# Patient Record
Sex: FEMALE | Race: ASIAN | URBAN - METROPOLITAN AREA
[De-identification: names, ages, dates, MRNs, and addresses within clinical notes are randomized per-mention and may not be internally consistent; named-entity substitution may affect disease eponyms.]

---

## 2020-01-23 ENCOUNTER — NURSE TRIAGE (OUTPATIENT)
Dept: FAMILY MEDICINE | Facility: CLINIC | Age: 60
End: 2020-01-23

## 2020-01-23 ENCOUNTER — OFFICE VISIT (OUTPATIENT)
Dept: FAMILY MEDICINE | Facility: CLINIC | Age: 60
End: 2020-01-23
Payer: COMMERCIAL

## 2020-01-23 VITALS
RESPIRATION RATE: 16 BRPM | WEIGHT: 144.4 LBS | OXYGEN SATURATION: 98 % | BODY MASS INDEX: 28.35 KG/M2 | HEART RATE: 71 BPM | HEIGHT: 60 IN | DIASTOLIC BLOOD PRESSURE: 78 MMHG | TEMPERATURE: 98.2 F | SYSTOLIC BLOOD PRESSURE: 133 MMHG

## 2020-01-23 DIAGNOSIS — E11.69 TYPE 2 DIABETES MELLITUS WITH OTHER SPECIFIED COMPLICATION, WITHOUT LONG-TERM CURRENT USE OF INSULIN (H): Primary | ICD-10-CM

## 2020-01-23 DIAGNOSIS — Z12.11 SPECIAL SCREENING FOR MALIGNANT NEOPLASMS, COLON: ICD-10-CM

## 2020-01-23 DIAGNOSIS — Z13.6 CARDIOVASCULAR SCREENING; LDL GOAL LESS THAN 100: ICD-10-CM

## 2020-01-23 DIAGNOSIS — Z11.59 NEED FOR HEPATITIS C SCREENING TEST: ICD-10-CM

## 2020-01-23 DIAGNOSIS — J45.31 MILD PERSISTENT ASTHMA WITH ACUTE EXACERBATION: ICD-10-CM

## 2020-01-23 DIAGNOSIS — J31.0 CHRONIC RHINITIS: ICD-10-CM

## 2020-01-23 DIAGNOSIS — Z11.4 ENCOUNTER FOR SCREENING FOR HIV: ICD-10-CM

## 2020-01-23 DIAGNOSIS — Z23 NEED FOR PROPHYLACTIC VACCINATION AND INOCULATION AGAINST INFLUENZA: ICD-10-CM

## 2020-01-23 DIAGNOSIS — Z23 NEED FOR 23-POLYVALENT PNEUMOCOCCAL POLYSACCHARIDE VACCINE: ICD-10-CM

## 2020-01-23 LAB
ALBUMIN SERPL-MCNC: 3.9 G/DL (ref 3.4–5)
ALP SERPL-CCNC: 95 U/L (ref 40–150)
ALT SERPL W P-5'-P-CCNC: 33 U/L (ref 0–50)
ANION GAP SERPL CALCULATED.3IONS-SCNC: 3 MMOL/L (ref 3–14)
AST SERPL W P-5'-P-CCNC: 27 U/L (ref 0–45)
BILIRUB SERPL-MCNC: 0.4 MG/DL (ref 0.2–1.3)
BUN SERPL-MCNC: 15 MG/DL (ref 7–30)
CALCIUM SERPL-MCNC: 8.9 MG/DL (ref 8.5–10.1)
CHLORIDE SERPL-SCNC: 108 MMOL/L (ref 94–109)
CHOLEST SERPL-MCNC: 209 MG/DL
CO2 SERPL-SCNC: 29 MMOL/L (ref 20–32)
CREAT SERPL-MCNC: 0.58 MG/DL (ref 0.52–1.04)
CREAT UR-MCNC: 65 MG/DL
GFR SERPL CREATININE-BSD FRML MDRD: >90 ML/MIN/{1.73_M2}
GLUCOSE SERPL-MCNC: 88 MG/DL (ref 70–99)
HBA1C MFR BLD: 5.9 % (ref 0–5.6)
HDLC SERPL-MCNC: 51 MG/DL
LDLC SERPL CALC-MCNC: 114 MG/DL
MICROALBUMIN UR-MCNC: 5 MG/L
MICROALBUMIN/CREAT UR: 8.13 MG/G CR (ref 0–25)
NONHDLC SERPL-MCNC: 158 MG/DL
POTASSIUM SERPL-SCNC: 4.4 MMOL/L (ref 3.4–5.3)
PROT SERPL-MCNC: 7.6 G/DL (ref 6.8–8.8)
SODIUM SERPL-SCNC: 140 MMOL/L (ref 133–144)
TRIGL SERPL-MCNC: 221 MG/DL
TSH SERPL DL<=0.005 MIU/L-ACNC: 0.71 MU/L (ref 0.4–4)

## 2020-01-23 PROCEDURE — 83036 HEMOGLOBIN GLYCOSYLATED A1C: CPT | Performed by: NURSE PRACTITIONER

## 2020-01-23 PROCEDURE — 80053 COMPREHEN METABOLIC PANEL: CPT | Performed by: NURSE PRACTITIONER

## 2020-01-23 PROCEDURE — 99207 C FOOT EXAM  NO CHARGE: CPT | Performed by: NURSE PRACTITIONER

## 2020-01-23 PROCEDURE — 36415 COLL VENOUS BLD VENIPUNCTURE: CPT | Performed by: NURSE PRACTITIONER

## 2020-01-23 PROCEDURE — 99203 OFFICE O/P NEW LOW 30 MIN: CPT | Mod: 25 | Performed by: NURSE PRACTITIONER

## 2020-01-23 PROCEDURE — 90472 IMMUNIZATION ADMIN EACH ADD: CPT | Performed by: NURSE PRACTITIONER

## 2020-01-23 PROCEDURE — 82043 UR ALBUMIN QUANTITATIVE: CPT | Performed by: NURSE PRACTITIONER

## 2020-01-23 PROCEDURE — 90732 PPSV23 VACC 2 YRS+ SUBQ/IM: CPT | Performed by: NURSE PRACTITIONER

## 2020-01-23 PROCEDURE — 90682 RIV4 VACC RECOMBINANT DNA IM: CPT | Performed by: NURSE PRACTITIONER

## 2020-01-23 PROCEDURE — 90471 IMMUNIZATION ADMIN: CPT | Performed by: NURSE PRACTITIONER

## 2020-01-23 PROCEDURE — 86803 HEPATITIS C AB TEST: CPT | Performed by: NURSE PRACTITIONER

## 2020-01-23 PROCEDURE — 90714 TD VACC NO PRESV 7 YRS+ IM: CPT | Performed by: NURSE PRACTITIONER

## 2020-01-23 PROCEDURE — 84443 ASSAY THYROID STIM HORMONE: CPT | Performed by: NURSE PRACTITIONER

## 2020-01-23 PROCEDURE — 80061 LIPID PANEL: CPT | Performed by: NURSE PRACTITIONER

## 2020-01-23 PROCEDURE — 87389 HIV-1 AG W/HIV-1&-2 AB AG IA: CPT | Performed by: NURSE PRACTITIONER

## 2020-01-23 RX ORDER — ALBUTEROL SULFATE 90 UG/1
AEROSOL, METERED RESPIRATORY (INHALATION)
COMMUNITY
Start: 2018-10-28 | End: 2020-01-23

## 2020-01-23 RX ORDER — FLUTICASONE PROPIONATE 110 UG/1
1 AEROSOL, METERED RESPIRATORY (INHALATION)
COMMUNITY
Start: 2019-03-14 | End: 2020-01-23

## 2020-01-23 RX ORDER — FLUTICASONE PROPIONATE 50 MCG
1 SPRAY, SUSPENSION (ML) NASAL DAILY
Qty: 11.1 ML | Refills: 3 | Status: SHIPPED | OUTPATIENT
Start: 2020-01-23 | End: 2020-02-05

## 2020-01-23 RX ORDER — ALBUTEROL SULFATE 90 UG/1
2 AEROSOL, METERED RESPIRATORY (INHALATION) 4 TIMES DAILY
Qty: 1 INHALER | Refills: 3 | Status: SHIPPED | OUTPATIENT
Start: 2020-01-23 | End: 2021-06-29

## 2020-01-23 RX ORDER — ATORVASTATIN CALCIUM 20 MG/1
TABLET, FILM COATED ORAL
COMMUNITY
Start: 2019-03-14 | End: 2020-07-08

## 2020-01-23 RX ORDER — FLUTICASONE PROPIONATE 110 UG/1
1 AEROSOL, METERED RESPIRATORY (INHALATION) 2 TIMES DAILY
Qty: 1 INHALER | Refills: 5 | Status: SHIPPED | OUTPATIENT
Start: 2020-01-23 | End: 2020-03-30

## 2020-01-23 SDOH — HEALTH STABILITY: MENTAL HEALTH: HOW OFTEN DO YOU HAVE A DRINK CONTAINING ALCOHOL?: NEVER

## 2020-01-23 ASSESSMENT — MIFFLIN-ST. JEOR: SCORE: 1146.49

## 2020-01-23 ASSESSMENT — PAIN SCALES - GENERAL: PAINLEVEL: NO PAIN (0)

## 2020-01-23 NOTE — LETTER
My Asthma Action Plan    Name: Yeyo Bradley   YOB: 1960  Date: 1/23/2020   My doctor: ANTONI Philippe CNP   My clinic: [facility]        My Control Medicine: Fluticasone propionate (Flovent Diskus) - 100 mcg inhale  1 puff twice daily  My Rescue Medicine: Albuterol (Proair/Ventolin/Proventil HFA) 2-4 puffs EVERY 4 HOURS as needed. Use a spacer if recommended by your provider.  My Oral Steroid Medicine: NONE My Asthma Severity:   Moderate Persistent  Know your asthma triggers: smoke, upper respiratory infections, humidity, exercise or sports and cold air               GREEN ZONE   Good Control    I feel good    No cough or wheeze    Can work, sleep and play without asthma symptoms       Take your asthma control medicine every day.     1. If exercise triggers your asthma, take your rescue medication    15 minutes before exercise or sports, and    During exercise if you have asthma symptoms  2. Spacer to use with inhaler: If you have a spacer, make sure to use it with your inhaler             YELLOW ZONE Getting Worse  I have ANY of these:    I do not feel good    Cough or wheeze    Chest feels tight    Wake up at night   1. Keep taking your Green Zone medications  2. Start taking your rescue medicine:    every 20 minutes for up to 1 hour. Then every 4 hours for 24-48 hours.  3. If you stay in the Yellow Zone for more than 12-24 hours, contact your doctor.  4. If you do not return to the Green Zone in 12-24 hours or you get worse, start taking your oral steroid medicine if prescribed by your provider.           RED ZONE Medical Alert - Get Help  I have ANY of these:    I feel awful    Medicine is not helping    Breathing getting harder    Trouble walking or talking    Nose opens wide to breathe       1. Take your rescue medicine NOW  2. If your provider has prescribed an oral steroid medicine, start taking it NOW  3. Call your doctor NOW  4. If you are still in the Red Zone  after 20 minutes and you have not reached your doctor:    Take your rescue medicine again and    Call 911 or go to the emergency room right away    See your regular doctor within 2 weeks of an Emergency Room or Urgent Care visit for follow-up treatment.          Annual Reminders:  Meet with Asthma Educator,  Flu Shot in the Fall, consider Pneumonia Vaccination for patients with asthma (aged 19 and older).    Pharmacy: [id number] [id number] [facility]    Electronically signed by ANTONI Philippe CNP   Date: 01/23/20                      Asthma Triggers  How To Control Things That Make Your Asthma Worse    Triggers are things that make your asthma worse.  Look at the list below to help you find your triggers and what you can do about them.  You can help prevent asthma flare-ups by staying away from your triggers.      Trigger                                                          What you can do   Cigarette Smoke  Tobacco smoke can make asthma worse. Do not allow smoking in your home, car or around you.  Be sure no one smokes at a child s day care or school.  If you smoke, ask your health care provider for ways to help you quit.  Ask family members to quit too.  Ask your health care provider for a referral to Quit Plan to help you quit smoking, or call 5-095-658-PLAN.     Colds, Flu, Bronchitis  These are common triggers of asthma. Wash your hands often.  Don t touch your eyes, nose or mouth.  Get a flu shot every year.     Dust Mites  These are tiny bugs that live in cloth or carpet. They are too small to see. Wash sheets and blankets in hot water every week.   Encase pillows and mattress in dust mite proof covers.  Avoid having carpet if you can. If you have carpet, vacuum weekly.   Use a dust mask and HEPA vacuum.   Pollen and Outdoor Mold  Some people are allergic to trees, grass, or weed pollen, or molds. Try to keep your windows closed.  Limit time out doors when pollen  count is high.   Ask you health care provider about taking medicine during allergy season.     Animal Dander  Some people are allergic to skin flakes, urine or saliva from pets with fur or feathers. Keep pets with fur or feathers out of your home.    If you can t keep the pet outdoors, then keep the pet out of your bedroom.  Keep the bedroom door closed.  Keep pets off cloth furniture and away from stuffed toys.     Mice, Rats, and Cockroaches   Some people are allergic to the waste from these pests.   Cover food and garbage.  Clean up spills and food crumbs.  Store grease in the refrigerator.   Keep food out of the bedroom.   Indoor Mold  This can be a trigger if your home has high moisture. Fix leaking faucets, pipes, or other sources of water.   Clean moldy surfaces.  Dehumidify basement if it is damp and smelly.   Smoke, Strong Odors, and Sprays  These can reduce air quality. Stay away from strong odors and sprays, such as perfume, powder, hair spray, paints, smoke incense, paint, cleaning products, candles and new carpet.   Exercise or Sports  Some people with asthma have this trigger. Be active!  Ask your doctor about taking medicine before sports or exercise to prevent symptoms.    Warm up for 5-10 minutes before and after sports or exercise.     Other Triggers of Asthma  Cold air:  Cover your nose and mouth with a scarf.  Sometimes laughing or crying can be a trigger.  Some medicines and food can trigger asthma.

## 2020-01-23 NOTE — TELEPHONE ENCOUNTER
Reason for Call:  Other call back    Detailed comments: Pt's daughter states that the pt was in for a cough but was not prescribed anything to help with it. She would like to request a call back to consult. Thank you.    Phone Number Patient can be reached at: Other phone number:  [phone]    Best Time: Any    Can we leave a detailed message on this number? YES    Call taken on 1/23/2020 at 5:38 PM by Cami Denise

## 2020-01-23 NOTE — PATIENT INSTRUCTIONS
Patient Education     Jay Jay?n [Asthma, Adult ]  Jay Jay?n (asthma) là m?t c ? n b?nh mà các ?? ?ng khí cristian ph?i b? co th?t và h?n ch? wilber?ng khí. Viêm và s?ng ? ? ?ng thông khí còn gây thêm s? h?n ch?. Cristian m? t c?n jay jay ?n c?p tính, các y?u t? này gây nên khó th?, th? khò khè, ho và t?c ng?c.    M? t c?n jay jay ?n có th? phát kh?i b?i bridger?u ? i?u. Các tác nhân phát kh?i thô ng th? ?ng ni g?m c?m l? nh thông th? ?ng, viêm ph? qu?n, viêm ph?i, bu?n phi?n và t?p th? d?c bridger?u. Cristian ulisses?ng phân n?a nh? ng ng? ?i l?n b? b?nh jay jay?n, thì d? ?ng v?i thu?c lá, các ch?t gây ô bridger?m cristian không khí, b?i, m?c, ph?n nahomy và v?y súc v?t có th? gây nên c?n jay jay ?n. B? paresh các li? u l? ?ng thu?c tr? jay jay?n hàng ngày c?ng có th? darryl l? i c?n jay jay ?n.  B?nh jay jay?n có th? ki? m soát ?? ?c v?i các lo?i thu?c thích h?p và gi?m ti?p xúc v?i các ch?t gây d? ? ng ? ã ?? ?c bi?t.  Ch?m Sóc T?i Tania:  1. U?ng bridger? u n? ?c ho?c các ch?t l?ng khác (ít nh?t là 10 ly m?i ngày) cristian khi b? c?n jay jay ?n. ?i ?u này làm l?ng các ch?t ti?t cristian ph?i và làm cho d? th? h?n . N?u quý v? b? b?nh ivonne ho?c th?n, hãy xác nh?n v?i bác s? c?a quý v? tr? ?c khi quý v? u?ng m? t l? ?ng ch?t l?ng quá caio ? ?nh.  2. Dùng thu?c ?? ?c ch? ? ? nh ?úng nh ? ? ã ?? ?c khuy?n cáo. N?u quý v? có m?t cái ?ng hít c?m lisette ho?c thu? c khí dung ? ? hít vào (aerosol breathing medicine), ? ?ng dùng quá m?t l?n m?i b?n gi? tr? khi ? ã ?? ?c b? o làm nh? v ?y. N? u ?? ?c cho dùng thu?c tr? sinh ho?c prednisone, hãy dùng h?t thu?c ngay c? khi quý v? c?m th? y ? ? h?n rosalee vài ngày.  3. ? ?ng hút thu?c lá. Tránh ti?p xúc v?i khói thu?c c? a ng? ?i khác.  4. ? ?ng dùng aspirin ho?c thu?c ch?ng viêm nh? ibuprofen (Advil, Motrin) ho?c naproxen (Aleve, Naprosyn). Có ? ?n 20% kh? n?ng là m? t ng? ?i có b?nh jay jay?n s? ph?n ?ng v?i các lo?i thu?c này. Acetaminophen (Tylenol) an toàn khi s? d?ng.  Ti?p T?c Bairon Dõi  v?i bác s? c?a quý v? ho?c bairon s? h? ?ng d?n c?a nhân viên chúng tôi. Leonela galaviz darryl  markus bên ng? ?i quý v? t?t c? các lo?i thu? c ?ang dùng ? ? bác s? có th? nhìn th?y.  N?u x?y ra b?t c? ?i?u nào rosalee ?ây hãy L?P T?C ?I?U TR? Y T?:    Th? khò khè ho?c h ?i th ? ng?n alistair t?ng    C?n ph?i dùng ?ng hít c?a quý v? bridger? u h?n th? ?ng l? mà không b?t    Sô?t 100.4 F (38 C) ho??c cortes h?n, ho??c markus chi? dâ?n cu?a ba?c si? cu?a caio? vi?    Ho có bridger? u ?àm có màu x?m ho? c có máu pablo ?àm (d?ch nh?y)    ? au ng?c markus t?ng nh?p th?    Quý v? không b? t ? ? u ? ? h?n pablo v òng 24 gi?  Date Last Reviewed: 5/1/2017 2000-2019 [employer]. [address] [place] [address], [place] [address]. T?t c? các caio?n ???c b?o l?u. Thông tin này không nh?m thay th? cho d?ch v? ch?m sóc y t? darryl tính chuyên môn. C?n luôn tuân markus s? ch? d?n t? chuyên alistair ch?m sóc s?c ulisses? c?a quý v?.           Patient Education     Ch? ?? ?n: B?nh ?ái tháo ???ng (Diabetes)  Th?c ?n là m?t công c? oxana tr?ng mà quý v? có th? s? d?ng ?? ki?m soát b?nh ?ái tháo ???ng và molina trì kh?e m?nh. ?n các b?a ?n có s? cân b?ng t?t v?i hàm l??ng th?c ?n ?úng s? giúp quý v? ki?m soát m?c glucose pablo máu c?a mình và ng?n ng?a các ph?n ?ng ???ng fritz?t th?p. Nó c?ng s? giúp quý v? gi?m các r?i ro s?c kh?e t? vi?c m?c b?nh ?ái tháo ???ng. M?t chuyên alistair ?n u?ng ???c ??ng ký (RD) s? gi?i thích ch? ?? ?n kiêng dành cho b?nh ?ái tháo ???ng và giúp quý v? l?p k? ho?ch b?a chính và b?a ph? có l?i cho s?c kh?e c?a quý v?. N?u quý v? có b?t k? câu h?i nào, ??ng ng?i và hãy g?i cho chuyên alistair ?n u?ng ?? ???c t? v?n.   Nguyên t?c thành công:    Ethan kh?o v?i bác s? c?a quý v? tr??c khi b?t ??u m?t ch? ?? ?n dành cho b?nh ?ái tháo ???ng ho?c ch??ng trình gi?m cân. N?u quý v? ch?a ethan kh?o v?i chuyên alistair ?n u?ng, hãy h?i bác s? c?a quý v? ?? h? gi?i thi?u.    Ch?n th?c ?n t? sáu nhóm th?c ?n. Chuyên alistair ?n u?ng c?a quý v? s? t? v?n cho quý v? v? các l?a ch?n th?c ?n pablo m?i nhóm, l??ng kh?u ph?n và quý v? có th? dùng ni nhiêu kh?u ph?n cho m?i b?a  ?n.  ? H?t, ??u và ra una b?t  ? Faith  ? Leora qu?  ? S?a ho?c s?a sabine  ? Th?t  ? Ch?t béo, ?? ng?t và r??u (ch? m?t ph?n nh? t? nhóm này)    Bairon dõi m?c ???ng fritz?t c?a quý v? nh? ???c bác s? c?a quý v? yêu c?u. Dùng m?i lo?i thu?c mà bác s? c?a quý v? ?ã kê ??n.    H?c cách ??c nhãn ekaterina d??ng và ch?n các kh?u ph?n ?n phù h?p.    Ch? ?n l??ng th?c ?n có pablo k? ho?ch b?a ?n c?a quý v?. ?n cùng l??ng th?c ?n vào ?úng gi? m?i ngày. Không ???c b? b?a. ?n các b?a cách nhau 4-5 gi? và ?n b?a ph? gi?a b?a.    H?n ch? r??u. Nó làm t?ng m?c ???ng fritz?t. U?ng n??c ho?c ?? u?ng không ch?a jacquelin s? d?ng ch?t t?o ng?t an toàn.    ?n ít ch?t béo ?? giúp gi?m r?i ro m?c b?nh ivonne c?a quý v?. S? d?ng s?n ph?m s?a không có ch?t béo ho?c ít ch?t béo và th?t n?c. Tránh th?c ?n xào. Dùng d?u th?c v?t không no.    Hãy nói orlando?n v?i chuyên alistair ekaterina d??ng c?a quý v? v? các ch?t thay th? ???ng.    Tránh thêm mu?i. Nó có th? góp ph?n gây ra fritz?t áp cortes, tình tr?ng này có th? gây ra b?nh ivonne. Ng??i m?c b?nh ?ái tháo ???ng v?n ?ã có r?i b? fritz?t áp cortes và b?nh ivonne.    Leonard trì m?t cân n?ng kh?e m?nh: N?u quý v? c?n gi?m cân, hãy gi?m kh?u ph?n ?n c?a mình. Nh?ng không ???c b? b?a. T?p th? d?c là m?t ph?n oxana tr?ng c?a b?t k? ch??ng trình qu?n lý cân n?ng nào. Hãy nói orlando?n v?i bác s? c?a quý v? v? m?t ch??ng trình t?p th? d?c phù h?p v?i quý v?.    ?? bi?t thêm thông tin v? k? ho?ch ?n u?ng t?t nh?t cho quý v?, hãy nói orlando?n v?i m?t chuyên alistair ?n u?ng ???c ??ng ký (RD). ?? ???c gi?i thi?u t?i RD t?i khu v?c c?a quý v?, hãy liên h?:  ? Vi?n Ekaterina d??ng và ?n u?ng [email or web address]  ? Hi?p h?i ?ái tháo ???ng [phone] [email or web address]  Date Last Reviewed: 8/1/2016 2000-2019 [email or web address]. [address] [place] [address], [place] [address]. T?t c? các caio?n ???c b?o l?u. Thông tin này không nh?m thay th? cho d?ch v? ch?m sóc y t? darryl tính chuflorn môn. C?n luôguillaume tuân bairon s? ch? d?n t? chuyên alistair ch?m sóc s?c ulisses? c?a javier v?.

## 2020-01-23 NOTE — NURSING NOTE
Prior to immunization administration, verified patients identity using patient s name and date of birth. Please see Immunization Activity for additional information.     Screening Questionnaire for Adult Immunization    Are you sick today?   No   Do you have allergies to medications, food, a vaccine component or latex?   No   Have you ever had a serious reaction after receiving a vaccination?   No   Do you have a long-term health problem with heart, lung, kidney, or metabolic disease (e.g., diabetes), asthma, a blood disorder, no spleen, complement component deficiency, a cochlear implant, or a spinal fluid leak?  Are you on long-term aspirin therapy?   Yes   Do you have cancer, leukemia, HIV/AIDS, or any other immune system problem?   No   Do you have a parent, brother, or sister with an immune system problem?   No   In the past 3 months, have you taken medications that affect  your immune system, such as prednisone, other steroids, or anticancer drugs; drugs for the treatment of rheumatoid arthritis, Crohn s disease, or psoriasis; or have you had radiation treatments?   No   Have you had a seizure, or a brain or other nervous system problem?   No   During the past year, have you received a transfusion of blood or blood    products, or been given immune (gamma) globulin or antiviral drug?   No   For women: Are you pregnant or is there a chance you could become       pregnant during the next month?   No   Have you received any vaccinations in the past 4 weeks?   No     Immunization questionnaire was positive for at least one answer.  Notified CARROL Chavez.        Per orders of Thein, CNP, injection of Td, PCV23 and Flu shot given by Dolores Hill MA. Patient instructed to remain in clinic for 15 minutes afterwards, and to report any adverse reaction to me immediately.       Screening performed by Dolores Hill MA on 1/23/2020 at 12:49 PM.

## 2020-01-23 NOTE — PROGRESS NOTES
"Subjective     Yeyo Bradley is a 59 year old female who presents to clinic today for the following health issues:    HPI       New Patient/Transfer of Care      Acute Illness   Acute illness concerns: cough  Onset: \"months'    Fever: no     Chills/Sweats: no     Headache (location?): with cough    Sinus Pressure:YES- maxillary worse with cough    Conjunctivitis:  no    Ear Pain: no    Rhinorrhea: YES-clear    Congestion: YES    Sore Throat: yes      Cough: YES-productive of green sputum in the am, then clear    Wheeze: YES    Decreased Appetite: no     Nausea: no     Vomiting: no     Diarrhea:  no     Dysuria/Freq.: no     Fatigue/Achiness: YES    Sick/Strep Exposure: no     Complain of bilateral rib cage pain due to coughing xmonth     Therapies Tried and outcome:  Cough drop/cough syrup      There is no problem list on file for this patient.    History reviewed. No pertinent surgical history.    Social History     Tobacco Use     Smoking status: Never Smoker     Smokeless tobacco: Never Used   Substance Use Topics     Alcohol use: Never     Frequency: Never     History reviewed. No pertinent family history.      Current Outpatient Medications   Medication Sig Dispense Refill     albuterol (PROAIR HFA/PROVENTIL HFA/VENTOLIN HFA) 108 (90 Base) MCG/ACT inhaler Inhale 2 puffs into the lungs 4 times daily 1 Inhaler 3     atorvastatin (LIPITOR) 20 MG tablet Take 1.5 tablets (30 mg) by mouth at bedtime.       fluticasone (FLONASE) 50 MCG/ACT nasal spray Spray 1 spray into both nostrils daily 11.1 mL 3     fluticasone (FLOVENT HFA) 110 MCG/ACT inhaler Inhale 1 puff into the lungs 2 times daily 1 Inhaler 5     metFORMIN (GLUCOPHAGE) 500 MG tablet Take 1 tablet (500 mg) by mouth once a day with breakfast. 90 tablet 3     order for DME Equipment being ordered: Glucometer per insurance preference, lancets, test strips.  Patient to check sugars 1 times daily, 90 day supply for test strips and lancets, refill 3 times 1 Device 0 "     No lab results found.   BP Readings from Last 3 Encounters:   No data found for BP    Wt Readings from Last 3 Encounters:   No data found for Wt                    Diabetes Follow-up      How often are you checking your blood sugar? Not at all    What concerns do you have today about your diabetes? None     Do you have any of these symptoms? (Select all that apply)  No numbness or tingling in feet.  No redness, sores or blisters on feet.  No complaints of excessive thirst.  No reports of blurry vision.  No significant changes to weight.    Have you had a diabetic eye exam in the last 12 months? No        BP Readings from Last 2 Encounters:   No data found for BP     No results found for: A1C, LDL            Reviewed and updated as needed this visit by Provider         Review of Systems   ROS COMP: Constitutional, HEENT, cardiovascular, pulmonary, gi and gu systems are negative, except as otherwise noted.      Objective    There were no vitals taken for this visit.  There is no height or weight on file to calculate BMI.  Physical Exam   GENERAL: healthy, alert and no distress  EYES: Eyes grossly normal to inspection, PERRL and conjunctivae and sclerae normal  HENT: ear canals and TM's normal, nose and mouth without ulcers or lesions  NECK: no adenopathy, no asymmetry, masses, or scars and thyroid normal to palpation  RESP: lungs clear to auscultation - no rales, rhonchi or wheezes  CV: regular rate and rhythm, normal S1 S2, no S3 or S4, no murmur, click or rub, no peripheral edema and peripheral pulses strong  ABDOMEN: soft, nontender, no hepatosplenomegaly, no masses and bowel sounds normal  MS: no gross musculoskeletal defects noted, no edema  SKIN: no suspicious lesions or rashes  NEURO: Normal strength and tone, mentation intact and speech normal  BACK: no CVA tenderness, no paralumbar tenderness  PSYCH: mentation appears normal, affect normal/bright  LYMPH: normal ant/post cervical, supraclavicular  nodes  Diabetic foot exam: normal DP and PT pulses, no trophic changes or ulcerative lesions, normal sensory exam and normal monofilament exam    Diagnostic Test Results:  Labs reviewed in Epic  Results for orders placed or performed in visit on 01/23/20   Hepatitis C Screen Reflex to HCV RNA Quant and Genotype     Status: None   Result Value Ref Range    Hepatitis C Antibody Nonreactive NR^Nonreactive   HIV Screening     Status: None   Result Value Ref Range    HIV Antigen Antibody Combo Nonreactive NR^Nonreactive       Lipid panel reflex to direct LDL Fasting     Status: Abnormal   Result Value Ref Range    Cholesterol 209 (H) <200 mg/dL    Triglycerides 221 (H) <150 mg/dL    HDL Cholesterol 51 >49 mg/dL    LDL Cholesterol Calculated 114 (H) <100 mg/dL    Non HDL Cholesterol 158 (H) <130 mg/dL   Comprehensive metabolic panel     Status: None   Result Value Ref Range    Sodium 140 133 - 144 mmol/L    Potassium 4.4 3.4 - 5.3 mmol/L    Chloride 108 94 - 109 mmol/L    Carbon Dioxide 29 20 - 32 mmol/L    Anion Gap 3 3 - 14 mmol/L    Glucose 88 70 - 99 mg/dL    Urea Nitrogen 15 7 - 30 mg/dL    Creatinine 0.58 0.52 - 1.04 mg/dL    GFR Estimate >90 >60 mL/min/[1.73_m2]    GFR Estimate If Black >90 >60 mL/min/[1.73_m2]    Calcium 8.9 8.5 - 10.1 mg/dL    Bilirubin Total 0.4 0.2 - 1.3 mg/dL    Albumin 3.9 3.4 - 5.0 g/dL    Protein Total 7.6 6.8 - 8.8 g/dL    Alkaline Phosphatase 95 40 - 150 U/L    ALT 33 0 - 50 U/L    AST 27 0 - 45 U/L   Hemoglobin A1c     Status: Abnormal   Result Value Ref Range    Hemoglobin A1C 5.9 (H) 0 - 5.6 %   Albumin Random Urine Quantitative with Creat Ratio     Status: None   Result Value Ref Range    Creatinine Urine 65 mg/dL    Albumin Urine mg/L 5 mg/L    Albumin Urine mg/g Cr 8.13 0 - 25 mg/g Cr   TSH with free T4 reflex     Status: None   Result Value Ref Range    TSH 0.71 0.40 - 4.00 mU/L           Assessment & Plan     1. Type 2 diabetes mellitus with other specified complication, without  long-term current use of insulin (H)  Getting her back on Metformin, begin checking her sugars daily at varying times, reviewed glycemic targets, she declines referral to CDE. Return to clinic 6 months for recheck.  - Comprehensive metabolic panel  - Hemoglobin A1c  - Albumin Random Urine Quantitative with Creat Ratio  - TSH with free T4 reflex  - metFORMIN (GLUCOPHAGE) 500 MG tablet; Take 1 tablet (500 mg) by mouth once a day with breakfast.  Dispense: 90 tablet; Refill: 3  - OPTOMETRY REFERRAL  - FOOT EXAM  - order for DME; Equipment being ordered: Glucometer per insurance preference, lancets, test strips.  Patient to check sugars 1 times daily, 90 day supply for test strips and lancets, refill 3 times  Dispense: 1 Device; Refill: 0    2. Chronic rhinitis  Advised humidified air at HS, frequent fluids, OK to continue on Tessalon (still has some), cough is likely due to  Nasal congestion, nasal congestion is clear by day so doubt sinusitis.    3. Mild persistent asthma with acute exacerbation  Refilled Flovent and albuterol, symptoms worsened with chronic cough.  - fluticasone (FLOVENT HFA) 110 MCG/ACT inhaler; Inhale 1 puff into the lungs 2 times daily  Dispense: 1 Inhaler; Refill: 5  - albuterol (PROAIR HFA/PROVENTIL HFA/VENTOLIN HFA) 108 (90 Base) MCG/ACT inhaler; Inhale 2 puffs into the lungs 4 times daily  Dispense: 1 Inhaler; Refill: 3    4. Need for prophylactic vaccination and inoculation against influenza    - Vaccine Administration, Initial [65220]  - INFLUENZA QUAD, RECOMBINANT, P-FREE (RIV4) (FLUBLOCK) [40787]  - Vaccine Administration, Each Additional [51653]    5. Need for 23-polyvalent pneumococcal polysaccharide vaccine    - Pneumococcal vaccine 23 valent PPSV23  (Pneumovax) [13343]    6. Special screening for malignant neoplasms, colon    - GASTROENTEROLOGY ADULT REF PROCEDURE ONLY    7. Encounter for screening for HIV    - HIV Screening    8. Need for hepatitis C screening test    - Hepatitis C  Screen Reflex to HCV RNA Quant and Genotype    9. CARDIOVASCULAR SCREENING; LDL GOAL LESS THAN 100    - Lipid panel reflex to direct LDL Fasting     BMI:   Estimated body mass index is 28.2 kg/m  as calculated from the following:    Height as of this encounter: 1.524 m (5').    Weight as of this encounter: 65.5 kg (144 lb 6.4 oz).   Weight management plan: Discussed healthy diet and exercise guidelines        Work on weight loss  Regular exercise  See Patient Instructions    Return in about 4 weeks (around 2/20/2020), or if symptoms worsen or fail to improve, for Routine Visit.    ANTONI Philippe [facility]

## 2020-01-24 ENCOUNTER — OFFICE VISIT (OUTPATIENT)
Dept: FAMILY MEDICINE | Facility: CLINIC | Age: 60
End: 2020-01-24
Payer: COMMERCIAL

## 2020-01-24 VITALS
BODY MASS INDEX: 28.19 KG/M2 | WEIGHT: 143.6 LBS | DIASTOLIC BLOOD PRESSURE: 62 MMHG | SYSTOLIC BLOOD PRESSURE: 121 MMHG | TEMPERATURE: 99.5 F | HEART RATE: 95 BPM | HEIGHT: 60 IN

## 2020-01-24 DIAGNOSIS — J06.9 UPPER RESPIRATORY TRACT INFECTION, UNSPECIFIED TYPE: Primary | ICD-10-CM

## 2020-01-24 LAB
FLUAV+FLUBV AG SPEC QL: NEGATIVE
FLUAV+FLUBV AG SPEC QL: NEGATIVE
HCV AB SERPL QL IA: NONREACTIVE
HIV 1+2 AB+HIV1 P24 AG SERPL QL IA: NONREACTIVE
SPECIMEN SOURCE: NORMAL

## 2020-01-24 PROCEDURE — 99213 OFFICE O/P EST LOW 20 MIN: CPT | Performed by: NURSE PRACTITIONER

## 2020-01-24 PROCEDURE — 87804 INFLUENZA ASSAY W/OPTIC: CPT | Performed by: NURSE PRACTITIONER

## 2020-01-24 RX ORDER — AZITHROMYCIN 250 MG/1
TABLET, FILM COATED ORAL
Qty: 6 TABLET | Refills: 0 | Status: SHIPPED | OUTPATIENT
Start: 2020-01-24 | End: 2020-02-05

## 2020-01-24 RX ORDER — CODEINE PHOSPHATE AND GUAIFENESIN 10; 100 MG/5ML; MG/5ML
1-2 SOLUTION ORAL EVERY 6 HOURS PRN
Qty: 180 ML | Refills: 0 | Status: SHIPPED | OUTPATIENT
Start: 2020-01-24 | End: 2020-02-04

## 2020-01-24 ASSESSMENT — ENCOUNTER SYMPTOMS
COUGH: 1
RHINORRHEA: 1
FEVER: 0
PALPITATIONS: 0
SORE THROAT: 1
DIZZINESS: 0
SINUS PRESSURE: 1
SHORTNESS OF BREATH: 0
VOMITING: 1
FATIGUE: 1
CHILLS: 1
HEADACHES: 1
NAUSEA: 1
TROUBLE SWALLOWING: 0
ABDOMINAL PAIN: 0

## 2020-01-24 ASSESSMENT — MIFFLIN-ST. JEOR: SCORE: 1142.87

## 2020-01-24 NOTE — TELEPHONE ENCOUNTER
.Reason for Call:  call back    Detailed comments:  Pt's daughter stated that the patient got injections  yesterday and that her right arm is now swelling . Pt is also requesting cough medication for her cough. Pt's daughter is requesting a call back to further discuss     Phone Number Patient can be reached at: Home number on file [phone] (home)    Best Time:      Can we leave a detailed message on this number? YES    Call taken on 1/24/2020 at 7:49 AM by Edu Mai

## 2020-01-24 NOTE — TELEPHONE ENCOUNTER
"  Additional Information    Negative: Difficulty with breathing or swallowing starts within 2 hours after injection    Negative: Difficult to awaken or acting confused (e.g., disoriented, slurred speech)    Negative: Unresponsive, passed out, or very weak    Negative: Sounds like a life-threatening emergency to the triager    Negative: Sounds like a severe, unusual reaction to the triager    Negative: Fever > 103 F (39.4 C)    Negative: Fever > 101 F (38.3 C) and over 60 years of age    Negative: Fever > 100.0 F (37.8 C) and has diabetes mellitus or a weak immune system (e.g., HIV positive, cancer chemotherapy, organ transplant, splenectomy, chronic steroids)    Negative: Fever > 100.0 F (37.8 C) and bedridden (e.g., nursing home patient, stroke, chronic illness, recovering from surgery)    Negative: Measles vaccine and purple/blood-colored rash (onset day 6-12)    Negative: Redness or red streak around the injection site begins > 48 hours after shot    Negative: Fever present > 3 days (72 hours)    Negative: Smallpox vaccine and eye pain, eye redness, or rash on eyelids    Patient wants to be seen    Negative: Deep lump follows (in 2 to 8 weeks) Td or TDaP shot, and becomes tender to the touch    Answer Assessment - Initial Assessment Questions  1. SYMPTOMS: \"What is the main symptom?\" (e.g., redness, swelling, pain)       Swelling of both upper arms, right is severe, pain, hard to move then  2. ONSET: \"When was the vaccine (shot) given?\" \"How much later did the  begin?\" (e.g., hours, days ago)       One hour after receiving the vaccinations  3. SEVERITY: \"How bad is it?\"       Per daughter severe  4. FEVER: \"Is there a fever?\" If so, ask: \"What is it, how was it measured, and when did it start?\"       no  5. IMMUNIZATIONS GIVEN: \"What shots have you recently received?\"      Td, influenza both left deltoid pneumonia in the right deltoid  6. PAST REACTIONS: \"Have you reacted to immunizations before?\" If so, ask: " "\"What happened?\"      no  7. OTHER SYMPTOMS: \"Do you have any other symptoms?\"      Inability to move arms as normal.    Gabrielle Ferrari RN, [facility] Triage    Protocols used: IMMUNIZATION [id number]    "

## 2020-01-24 NOTE — PROGRESS NOTES
Subjective     Yeyo Bradley is a 59 year old female who presents to clinic today for the following health issues:  A phone interpretor was used during office visit  HPI   RESPIRATORY SYMPTOMS      Duration: 1 month    Description  nasal congestion, rhinorrhea, sore throat, facial pain/pressure, cough, chills, ear pain both, headache, fatigue/malaise, myalgias, nausea and vomiting    Severity: moderate    Accompanying signs and symptoms: None    History (predisposing factors):  Family sick    Precipitating or alleviating factors: None    Therapies tried and outcome:  OTC cough meds     Reports coughing x 1 month, occasionally productive.  Reports coughing causes ribs to be sore.  Also having sinus congestion and sore throat.  Denies any known fever.  Reports OTC cough medications are not helping.      She also received an Influenza and Pneumococcal vaccine yesterday in a different clinic and reports tenderness of right arm at the injection site today.  She is concerned about this.        There is no problem list on file for this patient.    History reviewed. No pertinent surgical history.    Social History     Tobacco Use     Smoking status: Never Smoker     Smokeless tobacco: Never Used   Substance Use Topics     Alcohol use: Never     Frequency: Never     History reviewed. No pertinent family history.      Current Outpatient Medications   Medication Sig Dispense Refill     albuterol (PROAIR HFA/PROVENTIL HFA/VENTOLIN HFA) 108 (90 Base) MCG/ACT inhaler Inhale 2 puffs into the lungs 4 times daily 1 Inhaler 3     atorvastatin (LIPITOR) 20 MG tablet Take 1.5 tablets (30 mg) by mouth at bedtime.       azithromycin (ZITHROMAX) 250 MG tablet Take 2 tablets (500 mg) by mouth daily for 1 day, THEN 1 tablet (250 mg) daily for 4 days. 6 tablet 0     fluticasone (FLONASE) 50 MCG/ACT nasal spray Spray 1 spray into both nostrils daily 11.1 mL 3     fluticasone (FLOVENT HFA) 110 MCG/ACT inhaler Inhale 1 puff into the lungs 2  times daily 1 Inhaler 5     guaiFENesin-codeine (ROBITUSSIN AC) 100-10 MG/5ML solution Take 5-10 mLs by mouth every 6 hours as needed for cough 180 mL 0     metFORMIN (GLUCOPHAGE) 500 MG tablet Take 1 tablet (500 mg) by mouth once a day with breakfast. 90 tablet 3     order for DME Equipment being ordered: Glucometer per insurance preference, lancets, test strips.  Patient to check sugars 1 times daily, 90 day supply for test strips and lancets, refill 3 times 1 Device 0     No Known Allergies    Reviewed and updated as needed this visit by Provider         Review of Systems   Constitutional: Positive for chills and fatigue. Negative for fever.   HENT: Positive for congestion, ear pain, postnasal drip, rhinorrhea, sinus pressure and sore throat. Negative for trouble swallowing.    Respiratory: Positive for cough. Negative for shortness of breath.    Cardiovascular: Negative for chest pain and palpitations.   Gastrointestinal: Positive for nausea and vomiting. Negative for abdominal pain.   Neurological: Positive for headaches. Negative for dizziness.            Objective    /62   Pulse 95   Temp 99.5  F (37.5  C) (Oral)   Ht 1.524 m (5')   Wt 65.1 kg (143 lb 9.6 oz)   BMI 28.04 kg/m    Body mass index is 28.04 kg/m .  Physical Exam  Vitals signs reviewed.   Constitutional:       Appearance: She is well-developed.   HENT:      Head: Normocephalic.      Right Ear: Tympanic membrane normal.      Left Ear: Tympanic membrane normal.      Nose: Congestion present.      Mouth/Throat:      Lips: Pink.      Mouth: Mucous membranes are moist.      Pharynx: Oropharynx is clear.   Eyes:      General: Lids are normal.      Conjunctiva/sclera: Conjunctivae normal.   Neck:      Musculoskeletal: Normal range of motion and neck supple.   Cardiovascular:      Rate and Rhythm: Normal rate and regular rhythm.   Pulmonary:      Effort: Pulmonary effort is normal.      Breath sounds: Normal breath sounds.   Lymphadenopathy:       Cervical: No cervical adenopathy.   Skin:     General: Skin is warm and dry.      Comments: Mild warmth and tenderness at injection site of right deltoid from vaccine yesterday.  No evidence of cellulitis.     Neurological:      Mental Status: She is alert and oriented to person, place, and time.            Diagnostic Test Results:  Labs reviewed in Epic  Influenza A/B-- negative        Assessment & Plan     1. Upper respiratory tract infection, unspecified type  - Ensure adequate fluid intake.  - Influenza A/B antigen  - azithromycin (ZITHROMAX) 250 MG tablet; Take 2 tablets (500 mg) by mouth daily for 1 day, THEN 1 tablet (250 mg) daily for 4 days.  Dispense: 6 tablet; Refill: 0  - guaiFENesin-codeine (ROBITUSSIN AC) 100-10 MG/5ML solution; Take 5-10 mLs by mouth every 6 hours as needed for cough  Dispense: 180 mL; Refill: 0    Advised to monitor injection site of right deltoid.  Apply compress several times per day over the weekend.  Notify office if worsening redness, pain, or swelling on Monday.          Return in about 1 week (around 1/31/2020) for worsening symptoms or failure to improve.    Naa Bradley NP  [facility]

## 2020-01-24 NOTE — TELEPHONE ENCOUNTER
Scheduled in clinic for assessment of injection sites and request for cough medication.    Gabrielle Ferrari RN, [facility] Triage

## 2020-02-03 ENCOUNTER — TRANSFERRED RECORDS (OUTPATIENT)
Dept: HEALTH INFORMATION MANAGEMENT | Facility: CLINIC | Age: 60
End: 2020-02-03

## 2020-02-03 ENCOUNTER — TELEPHONE (OUTPATIENT)
Dept: FAMILY MEDICINE | Facility: CLINIC | Age: 60
End: 2020-02-03

## 2020-02-03 DIAGNOSIS — J06.9 ACUTE UPPER RESPIRATORY INFECTION: Primary | ICD-10-CM

## 2020-02-03 LAB
HBA1C MFR BLD: 6.3 % (ref 0–5.7)
HPV ABSTRACT: NORMAL
PAP-ABSTRACT: NORMAL

## 2020-02-03 RX ORDER — BENZONATATE 200 MG/1
200 CAPSULE ORAL 3 TIMES DAILY PRN
Qty: 30 CAPSULE | Refills: 0 | Status: SHIPPED | OUTPATIENT
Start: 2020-02-03 | End: 2020-07-10

## 2020-02-03 NOTE — TELEPHONE ENCOUNTER
Patient was seen on 1/24 for a cough. She has used the cough medicine but is still coughing. Is there something else she can try? Does she need to be seen again? Ok to leave a detailed message.

## 2020-02-03 NOTE — TELEPHONE ENCOUNTER
Returned call to daughter and gave her providers message below.  She understands this.   Also informed her that if this medication doesn't improve cough, patient should be seen again.     Nirmala ANGELESN, RN

## 2020-02-03 NOTE — TELEPHONE ENCOUNTER
Patient seen 1.5 weeks ago for URI. Was treated with Robitussin AC and zpak.   Patient still has bothersome cough that is sometimes productive.   Robitussin not helping very much and is requesting something else for cough.     Routing to provider to advise.  Nirmala Stack BSN, RN

## 2020-02-04 ENCOUNTER — TELEPHONE (OUTPATIENT)
Dept: FAMILY MEDICINE | Facility: CLINIC | Age: 60
End: 2020-02-04

## 2020-02-04 DIAGNOSIS — J06.9 UPPER RESPIRATORY TRACT INFECTION, UNSPECIFIED TYPE: ICD-10-CM

## 2020-02-04 RX ORDER — CODEINE PHOSPHATE AND GUAIFENESIN 10; 100 MG/5ML; MG/5ML
5-10 SOLUTION ORAL EVERY 6 HOURS PRN
Qty: 180 ML | Refills: 0 | Status: SHIPPED | OUTPATIENT
Start: 2020-02-04 | End: 2020-02-05

## 2020-02-04 NOTE — TELEPHONE ENCOUNTER
Returned call to daughter Laurita.    See telephone encounter from yesterday regarding cough.  Tessalon was ordered and then patient was seen at [facility] clinic and tessalon was ordered again.   Daughter now is requesting order for nebulizer and medication for ongoing cough.   Informed daughter again that if her cough isn't improving, she needs to be seen and certainly if she is requesting a nebulizer.   Offered urgent care that opens here in a couple hours.  Daughter declined this and requested appointment for tomorrow.   Made appointment with Kristen Kehr, PA for 12:00 pm 2/5/2020.    Nirmala Stack BSN, RN

## 2020-02-04 NOTE — TELEPHONE ENCOUNTER
Daughter states her mother is requesting nebulizer  Treatments at home. This has helped her coughing before. Patient needs a machine and the solution ordered. Ok to leave a detailed message.

## 2020-02-05 ENCOUNTER — OFFICE VISIT (OUTPATIENT)
Dept: FAMILY MEDICINE | Facility: CLINIC | Age: 60
End: 2020-02-05
Payer: COMMERCIAL

## 2020-02-05 ENCOUNTER — TELEPHONE (OUTPATIENT)
Dept: FAMILY MEDICINE | Facility: CLINIC | Age: 60
End: 2020-02-05

## 2020-02-05 VITALS
HEART RATE: 76 BPM | BODY MASS INDEX: 27.93 KG/M2 | TEMPERATURE: 98.2 F | OXYGEN SATURATION: 96 % | WEIGHT: 143 LBS | DIASTOLIC BLOOD PRESSURE: 73 MMHG | SYSTOLIC BLOOD PRESSURE: 113 MMHG

## 2020-02-05 DIAGNOSIS — J06.9 ACUTE UPPER RESPIRATORY INFECTION: ICD-10-CM

## 2020-02-05 DIAGNOSIS — J45.41 MODERATE PERSISTENT ASTHMA WITH EXACERBATION: Primary | ICD-10-CM

## 2020-02-05 DIAGNOSIS — E11.69 TYPE 2 DIABETES MELLITUS WITH OTHER SPECIFIED COMPLICATION, WITHOUT LONG-TERM CURRENT USE OF INSULIN (H): ICD-10-CM

## 2020-02-05 PROCEDURE — 99214 OFFICE O/P EST MOD 30 MIN: CPT | Performed by: PHYSICIAN ASSISTANT

## 2020-02-05 RX ORDER — PREDNISONE 20 MG/1
40 TABLET ORAL DAILY
Qty: 10 TABLET | Refills: 0 | Status: SHIPPED | OUTPATIENT
Start: 2020-02-05 | End: 2020-07-08

## 2020-02-05 RX ORDER — ALBUTEROL SULFATE 0.83 MG/ML
2.5 SOLUTION RESPIRATORY (INHALATION) EVERY 6 HOURS PRN
Qty: 1 BOX | Refills: 0 | Status: SHIPPED | OUTPATIENT
Start: 2020-02-05 | End: 2020-03-30

## 2020-02-05 ASSESSMENT — PAIN SCALES - GENERAL: PAINLEVEL: NO PAIN (0)

## 2020-02-05 NOTE — TELEPHONE ENCOUNTER
Reason for Call:  Other call back    Detailed comments: Daughter is calling stating mom was just seen and would like to know what RX: predniSONE (DELTASONE) 20 MG tablet will be taken for. Please call to advise. Thank you.    Phone Number Patient can be reached at: [phone]    Best Time:     Can we leave a detailed message on this number? YES    Call taken on 2/5/2020 at 3:27 PM by Yahaira Santos

## 2020-02-05 NOTE — LETTER
[facility]  [address] [place] [address]  Phone: [phone]    February 5, 2020        Yeyo Bradley  [address] [facility] [address]          To whom it may concern:    RE: Yeyo Bradley    Patient was seen and treated today at our clinic and missed work.    Please contact me for questions or concerns.      Sincerely,        Kristen M. Kehr, PA-C

## 2020-02-05 NOTE — PATIENT INSTRUCTIONS
Schedule an appointment with your primary provider if needed for diabetes supplies.     If cough persists beyond 2 weeks, then you need an appointment with your primary provider for the cough.     Start the treatments discussed today.   Prednisone 40 mg daily x 5 days.   Nebulizer and albuterol can be used every 4-6 hours

## 2020-02-05 NOTE — TELEPHONE ENCOUNTER
Per office visit notes from today 2/5/20, patient's daughter was interpreting for her.  Left message on answering machine for patient/parent to call back.   [phone].    Office visit notes with Kristen Kehr, PA-C 2/5/20  Assessment & Plan         1. Moderate persistent asthma with exacerbation  2. Acute upper respiratory infection  She is not using the albuterol very often. She is going to try a nebulizer and advised to use efery 4-6 hours as needed.   Add prednisone. She is aware that her blood sugars will be elevated with the use of this medication.   Letter given for work.   She needs to give the URI time to resolve.   - predniSONE (DELTASONE) 20 MG tablet; Take 2 tablets (40 mg) by mouth daily  Dispense: 10 tablet; Refill: 0  - order for DME; Glucometer, lancets and test strips. brand as covered by insurance.  Dispense: 1 each; Refill: 11  - albuterol (PROVENTIL) (2.5 MG/3ML) 0.083% neb solution; Take 1 vial (2.5 mg) by nebulization every 6 hours as needed for shortness of breath / dyspnea or wheezing  Dispense: 1 Box; Refill: 0      Maya Figueroa RN

## 2020-02-05 NOTE — PROGRESS NOTES
Subjective     Yeyo Bradley is a 59 year old female who presents to clinic today with her daughter interpreting for her, for the following health issues:    HPI     Recheck cough ongoing,  patient was just seen on 01/24/20 for URI , not getting better.     She was seen 1/24 and treated with antibiotic   Again in 2/3 and had a CXR and added tessalon. She gets an upset stomach from the tessalon and did not tolerate the cough syrup either.   She has asthma and using her daily flovent inhaler along with albuterol 2 times daily.   She is having a hard time sleeping due to the cough.   No recent travel. No fever.   No sputum production.     She checks her blood sugars daily and her last A1C was 6.3%.     She reports being sick with increase in cough off and on for the past 3 months. She will get better for a short time, but then have an increase in congestion, cough and URI symptoms. She has no known ill contacts in her home.       There is no problem list on file for this patient.    History reviewed. No pertinent surgical history.    Social History     Tobacco Use     Smoking status: Never Smoker     Smokeless tobacco: Never Used   Substance Use Topics     Alcohol use: Never     Frequency: Never     History reviewed. No pertinent family history.      Current Outpatient Medications   Medication Sig Dispense Refill     albuterol (PROAIR HFA/PROVENTIL HFA/VENTOLIN HFA) 108 (90 Base) MCG/ACT inhaler Inhale 2 puffs into the lungs 4 times daily 1 Inhaler 3     albuterol (PROVENTIL) (2.5 MG/3ML) 0.083% neb solution Take 1 vial (2.5 mg) by nebulization every 6 hours as needed for shortness of breath / dyspnea or wheezing 1 Box 0     atorvastatin (LIPITOR) 20 MG tablet Take 1.5 tablets (30 mg) by mouth at bedtime.       benzonatate (TESSALON) 200 MG capsule Take 1 capsule (200 mg) by mouth 3 times daily as needed for cough 30 capsule 0     fluticasone (FLOVENT HFA) 110 MCG/ACT inhaler Inhale 1 puff into the lungs 2 times  daily 1 Inhaler 5     metFORMIN (GLUCOPHAGE) 500 MG tablet Take 1 tablet (500 mg) by mouth once a day with breakfast. 90 tablet 3     order for DME Glucometer, lancets and test strips. brand as covered by insurance. 1 each 11     order for DME Equipment being ordered: Nebulizer and mask and tubing supplies 1 Device 0     predniSONE (DELTASONE) 20 MG tablet Take 2 tablets (40 mg) by mouth daily 10 tablet 0     order for DME Equipment being ordered: Glucometer per insurance preference, lancets, test strips.  Patient to check sugars 1 times daily, 90 day supply for test strips and lancets, refill 3 times 1 Device 0     No Known Allergies  BP Readings from Last 3 Encounters:   02/05/20 113/73   01/24/20 121/62   01/23/20 133/78    Wt Readings from Last 3 Encounters:   02/05/20 64.9 kg (143 lb)   01/24/20 65.1 kg (143 lb 9.6 oz)   01/23/20 65.5 kg (144 lb 6.4 oz)                    Reviewed and updated as needed this visit by Provider         Review of Systems   ROS COMP: Constitutional, HEENT, cardiovascular, pulmonary, GI, , musculoskeletal, neuro, skin, endocrine and psych systems are negative, except as otherwise noted.      Objective    /73   Pulse 76   Temp 98.2  F (36.8  C) (Oral)   Wt 64.9 kg (143 lb)   SpO2 96%   BMI 27.93 kg/m    Body mass index is 27.93 kg/m .  Physical Exam   GENERAL: healthy, alert and no distress  HENT: ear canals and TM's normal, nose and mouth without ulcers or lesions  NECK: no adenopathy, no asymmetry, masses, or scars and thyroid normal to palpation  RESP: lungs clear to auscultation - no rales, rhonchi or wheezes  CV: regular rate and rhythm, normal S1 S2, no S3 or S4, no murmur, click or rub, no peripheral edema and peripheral pulses strong  MS: no gross musculoskeletal defects noted, no edema  SKIN: no suspicious lesions or rashes  PSYCH: mentation appears normal, affect normal/bright    Diagnostic Test Results:  Labs reviewed in Epic        Assessment & Plan     1.  Moderate persistent asthma with exacerbation  2. Acute upper respiratory infection  She is not using the albuterol very often. She is going to try a nebulizer and advised to use efery 4-6 hours as needed.   Add prednisone. She is aware that her blood sugars will be elevated with the use of this medication.   Letter given for work.   She needs to give the URI time to resolve.   - predniSONE (DELTASONE) 20 MG tablet; Take 2 tablets (40 mg) by mouth daily  Dispense: 10 tablet; Refill: 0  - order for DME; Glucometer, lancets and test strips. brand as covered by insurance.  Dispense: 1 each; Refill: 11  - albuterol (PROVENTIL) (2.5 MG/3ML) 0.083% neb solution; Take 1 vial (2.5 mg) by nebulization every 6 hours as needed for shortness of breath / dyspnea or wheezing  Dispense: 1 Box; Refill: 0      3. Type 2 diabetes mellitus with other specified complication, without long-term current use of insulin (H)  She also requested a new glucometer and is unable to call her primary provider due to language barrier. I have written a new DME order for her.   Plan follow up with her primary provider.   - order for DME; Equipment being ordered: Nebulizer and mask and tubing supplies  Dispense: 1 Device; Refill: 0         Return in about 3 weeks (around 2/26/2020) for with primary provider, medication check.    Kristen M. Kehr, PA-C  [facility]

## 2020-02-05 NOTE — NURSING NOTE
Chief Complaint   Patient presents with     Cough     recheck       Initial /73   Pulse 76   Temp 98.2  F (36.8  C) (Oral)   Wt 64.9 kg (143 lb)   SpO2 96%   BMI 27.93 kg/m   Estimated body mass index is 27.93 kg/m  as calculated from the following:    Height as of 1/24/20: 1.524 m (5').    Weight as of this encounter: 64.9 kg (143 lb).  Medication Reconciliation: azael Frances MA

## 2020-02-06 NOTE — TELEPHONE ENCOUNTER
Left message on answering machine for patient to call back to [phone].  Majo Kapoor BSN, RN

## 2020-02-07 NOTE — TELEPHONE ENCOUNTER
RN contacted pt with assistance of [place]ian  #[id number].   Pt's daughter, Lyla, answered the phone and states she has questions regarding pt's plan of care from recent office visit. RN advised there is no YURI to discuss PHI on file and requested to speak directly with pt with the [place]ian . Pt joined the call, with her daughter, and [place]ian  #[id number].    RN relayed 2/5/20 provider office visit assessment and plan notes as found in note below. RN reviewed respiratory dx associated the the 2/5/20 office visit.  RN discussed proper use of daily maintenance medication Flovent, and addition of prednisone as prescribed for this asthma exacerbation and URI, and when to use prn albuterol. Discussed pt should use albuterol neb solution, or albuterol MDI, not both as it contains the same drug. Discussed use of a nebulizer machine per pt request. Pt and her daughter Lyla, and indicates understanding of issues and agrees with the plan and both agree they have no additional questions at this time.    predniSONE (DELTASONE) 20 MG tablet 10 tablet 0 2/5/2020  --   Sig - Route: Take 2 tablets (40 mg) by mouth daily - Oral   Moderate persistent asthma with exacerbation [J45.41]    Amber Coleman, BRIDGETTEN, RN

## 2020-02-19 ENCOUNTER — TELEPHONE (OUTPATIENT)
Dept: FAMILY MEDICINE | Facility: CLINIC | Age: 60
End: 2020-02-19

## 2020-03-11 ENCOUNTER — OFFICE VISIT (OUTPATIENT)
Dept: OPTOMETRY | Facility: CLINIC | Age: 60
End: 2020-03-11
Payer: COMMERCIAL

## 2020-03-11 DIAGNOSIS — E11.9 TYPE 2 DIABETES MELLITUS WITHOUT COMPLICATION, WITHOUT LONG-TERM CURRENT USE OF INSULIN (H): Primary | ICD-10-CM

## 2020-03-11 DIAGNOSIS — H52.4 PRESBYOPIA: ICD-10-CM

## 2020-03-11 DIAGNOSIS — H52.223 REGULAR ASTIGMATISM OF BOTH EYES: ICD-10-CM

## 2020-03-11 PROCEDURE — 92015 DETERMINE REFRACTIVE STATE: CPT | Performed by: OPTOMETRIST

## 2020-03-11 PROCEDURE — 92004 COMPRE OPH EXAM NEW PT 1/>: CPT | Performed by: OPTOMETRIST

## 2020-03-11 ASSESSMENT — REFRACTION_MANIFEST
OS_AXIS: 176
OD_CYLINDER: +1.00
OS_SPHERE: +1.25
METHOD_AUTOREFRACTION: 1
OD_CYLINDER: +1.00
OD_SPHERE: +0.25
OS_ADD: +2.00
OD_AXIS: 031
OS_CYLINDER: +0.50
OD_ADD: +2.00
OD_AXIS: 035
OS_AXIS: 160
OD_SPHERE: +0.25
OS_CYLINDER: +0.50
OS_SPHERE: +0.25

## 2020-03-11 ASSESSMENT — REFRACTION_WEARINGRX
OS_SPHERE: +1.50
OD_SPHERE: +1.50
SPECS_TYPE: OTC'S

## 2020-03-11 ASSESSMENT — EXTERNAL EXAM - LEFT EYE: OS_EXAM: NORMAL

## 2020-03-11 ASSESSMENT — CONF VISUAL FIELD
OS_NORMAL: 1
METHOD: COUNTING FINGERS
OD_NORMAL: 1

## 2020-03-11 ASSESSMENT — KERATOMETRY
OS_K2POWER_DIOPTERS: 45.25
OD_K1POWER_DIOPTERS: 45.25
OD_AXISANGLE2_DEGREES: 143
OS_K1POWER_DIOPTERS: 45.25
OD_K2POWER_DIOPTERS: 45.75
OS_AXISANGLE2_DEGREES: 180

## 2020-03-11 ASSESSMENT — SLIT LAMP EXAM - LIDS
COMMENTS: NORMAL
COMMENTS: NORMAL

## 2020-03-11 ASSESSMENT — VISUAL ACUITY
OD_CC: 20/30-1
OD_SC+: -2
OS_SC: 20/30
CORRECTION_TYPE: GLASSES
OS_CC: 20/40-1
OD_SC: 20/25
METHOD: NUMBERS - LINEAR
OS_SC+: -3

## 2020-03-11 ASSESSMENT — TONOMETRY
OS_IOP_MMHG: 20
IOP_METHOD: APPLANATION
OD_IOP_MMHG: 20

## 2020-03-11 ASSESSMENT — CUP TO DISC RATIO
OS_RATIO: 0.2
OD_RATIO: 0.2

## 2020-03-11 ASSESSMENT — EXTERNAL EXAM - RIGHT EYE: OD_EXAM: NORMAL

## 2020-03-11 NOTE — PROGRESS NOTES
Chief Complaint   Patient presents with     Diabetic Eye Exam     New to Eye Dept    DM2 no use of insulin    Accompanied by   Hemoglobin A1C   Date Value Ref Range Status   01/23/2020 5.9 (H) 0 - 5.6 % Final     Comment:     Normal <5.7% Prediabetes 5.7-6.4%  Diabetes 6.5% or higher - adopted from ADA   consensus guidelines.           Last Eye Exam: 4-5 years ago in [place]   Dilated Previously: Yes    What are you currently using to see?  Readers, uses +1.50's for reading     Distance Vision Acuity: Satisfied with vision    Near Vision Acuity: Satisfied with vision while reading  with readers, does not use a computer     Eye Comfort: Sometimes dry and sometimes they water  Do you use eye drops? 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       OBJECTIVE: See Ophthalmology exam    ASSESSMENT:    ICD-10-CM    1. Type 2 diabetes mellitus without complication, without long-term current use of insulin (H)  E11.9 EYE EXAM (SIMPLE-NONBILLABLE)     REFRACTION   2. Presbyopia  H52.4 EYE EXAM (SIMPLE-NONBILLABLE)     REFRACTION   3. Regular astigmatism of both eyes  H52.223 EYE EXAM (SIMPLE-NONBILLABLE)     REFRACTION      PLAN:    Yeyo Bradley aware  eye exam results will be sent to Naa Bradley  Patient Instructions   Patient was advised of today's exam findings.  Fill glasses prescription  Allow 2 weeks to adapt to change in glasses  Keep blood sugar under good control  Return in 1 year for diabetic eye exam      Diabetes weakens the blood vessels all over the body, including the eyes. Damage to the blood vessels in the eyes can cause swelling or bleeding into part of the eye (called the retina). This is called diabetic retinopathy (CARLY-tin-AH-puh-thee). If not treated, this disease can cause vision loss or blindness.   Symptoms may include blurred or distorted  vision, but many people have no symptoms. It's important to see your eye doctor regularly to check for problems.   Early treatment and good control can help protect your vision. Here are the things you can do to help prevent vision loss:      1. Keep your blood sugar levels under tight control.      2. Bring high blood pressure under control.      3. No smoking.      4. Have yearly dilated eye exams.      Rosa Cueva O.D.  [facility]   [address] [place] [address]  [phone]

## 2020-03-11 NOTE — Clinical Note
3/11/2020         RE: Yeyo Bradley  [address] [address] [address]        Dear Colleague,    Thank you for referring your patient, Yeyo Bradley, to the [facility]. Please see a copy of my visit note below.    Chief Complaint   Patient presents with     Diabetic Eye Exam     New to Eye Dept    DM2 no use of insulin    Accompanied by   Hemoglobin A1C   Date Value Ref Range Status   01/23/2020 5.9 (H) 0 - 5.6 % Final     Comment:     Normal <5.7% Prediabetes 5.7-6.4%  Diabetes 6.5% or higher - adopted from ADA   consensus guidelines.           Last Eye Exam: 4-5 years ago in [place]   Dilated Previously: Yes    What are you currently using to see?  Readers, uses +1.50's for reading     Distance Vision Acuity: Satisfied with vision    Near Vision Acuity: Satisfied with vision while reading  with readers, does not use a computer     Eye Comfort: Sometimes dry and sometimes they water  Do you use eye drops? : Yes: Uses an OTC Artificial Tear   Occupation or Hobbies: [employer]     [employer] Optometric Assistant      Medical, surgical and family histories reviewed and updated 3/11/2020.       OBJECTIVE: See Ophthalmology exam    ASSESSMENT:    ICD-10-CM    1. Type 2 diabetes mellitus without complication, without long-term current use of insulin (H)  E11.9    2. Presbyopia  H52.4    3. Regular astigmatism of both eyes  H52.223       PLAN:    Yeyo Bradley aware  eye exam results will be sent to Naa Bradley  Patient Instructions   Patient was advised of today's exam findings.  Fill glasses prescription  Allow 2 weeks to adapt to change in glasses  Keep blood sugar under good control  Return in 1 year for diabetic eye exam      Diabetes weakens the blood vessels all over the body, including the eyes. Damage to the blood vessels in the eyes can cause swelling or bleeding into part of the eye (called the retina). This is called diabetic retinopathy (CARLY-vonnie-AH-puh-thee).  If not treated, this disease can cause vision loss or blindness.   Symptoms may include blurred or distorted vision, but many people have no symptoms. It's important to see your eye doctor regularly to check for problems.   Early treatment and good control can help protect your vision. Here are the things you can do to help prevent vision loss:      1. Keep your blood sugar levels under tight control.      2. Bring high blood pressure under control.      3. No smoking.      4. Have yearly dilated eye exams.      Rosa Cueva O.D.  [address] [id number] [phone]               Again, thank you for allowing me to participate in the care of your patient.        Sincerely,        Rosa Cueva, OD

## 2020-03-11 NOTE — LETTER
3/11/2020         RE: Yeyo Bradley  [address] [facility] [address]        Dear Colleague,    Thank you for referring your patient, Yeyo Bradley, to the [facility]. No diabetic retinopathy was found at eye exam.       Again, thank you for allowing me to participate in the care of your patient.        Sincerely,        Rosa Cueva OD

## 2020-03-11 NOTE — PATIENT INSTRUCTIONS
Patient was advised of today's exam findings.  Fill glasses prescription  Allow 2 weeks to adapt to change in glasses  Keep blood sugar under good control  Return in 1 year for diabetic eye exam      Diabetes weakens the blood vessels all over the body, including the eyes. Damage to the blood vessels in the eyes can cause swelling or bleeding into part of the eye (called the retina). This is called diabetic retinopathy (CARLY-tin-AH-puh-thee). If not treated, this disease can cause vision loss or blindness.   Symptoms may include blurred or distorted vision, but many people have no symptoms. It's important to see your eye doctor regularly to check for problems.   Early treatment and good control can help protect your vision. Here are the things you can do to help prevent vision loss:      1. Keep your blood sugar levels under tight control.      2. Bring high blood pressure under control.      3. No smoking.      4. Have yearly dilated eye exams.      Rosa Cueva O.D.  [facility]   [address] [place], [place] [id number] [phone]

## 2020-03-17 ENCOUNTER — APPOINTMENT (OUTPATIENT)
Dept: OPTOMETRY | Facility: CLINIC | Age: 60
End: 2020-03-17
Payer: COMMERCIAL

## 2020-03-17 PROCEDURE — 92340 FIT SPECTACLES MONOFOCAL: CPT | Performed by: OPTOMETRIST

## 2020-03-28 ENCOUNTER — VIRTUAL VISIT (OUTPATIENT)
Dept: FAMILY MEDICINE | Facility: OTHER | Age: 60
End: 2020-03-28
Payer: COMMERCIAL

## 2020-03-28 ENCOUNTER — TELEPHONE (OUTPATIENT)
Dept: FAMILY MEDICINE | Facility: CLINIC | Age: 60
End: 2020-03-28

## 2020-03-28 PROCEDURE — 99421 OL DIG E/M SVC 5-10 MIN: CPT | Performed by: FAMILY MEDICINE

## 2020-03-28 NOTE — PROGRESS NOTES
"Date: 2020 12:28:02  Clinician: LORELEI Higgins  Clinician NPI: [id number]  Patient: Yeyo Bradley  Patient : 1960  Patient Address: [address] [place] [address] [place] [address]  Patient Phone: [phone]  Visit Protocol: URI  Patient Summary:  Yeyo is a 59 year old ( : 1960 ) female who initiated a Visit for cold, sinus infection, or influenza. When asked the question \"Please sign me up to receive news, health information and promotions from [employer].\", Yeyo responded \"Yes\".    Yeyo states her symptoms started gradually 1 month or more ago. After her symptoms started, they improved and then got worse again.   Her symptoms consist of a headache, wheezing, a cough, and malaise. She is experiencing mild difficulty breathing with activities but can speak normally in full sentences.   Symptom details     Cough: Yeyo coughs almost every minute and her cough is more bothersome at night. Phlegm comes into her throat when she coughs. She does not believe her cough is caused by post-nasal drip. The color of the phlegm is clear.     Wheezing: Yeyo has been diagnosed with asthma. The wheezing interferes with her normal daily activities.    Headache: Yeyo has not had the headache for 12 weeks or more. She states the headache is moderate (4-6 on a 10 point pain scale).      Yeyo denies having ear pain, rhinitis, teeth pain, fever, facial pain or pressure, myalgias, chills, sore throat, and nasal congestion. She also denies having a sinus infection within the past year, taking antibiotic medication for the symptoms, and having recent facial or sinus surgery in the past 60 days.   Precipitating events  She has not recently been exposed to someone with influenza. Yeyo has not been in close contact with any high risk individuals.   Pertinent COVID-19 (Coronavirus) information  Yeyo has not traveled internationally or to the areas where COVID-19 (Coronavirus) is widespread, including cruise ship travel in the last " 14 days before the start of her symptoms.   Yeyo has not had a close contact with a laboratory-confirmed COVID-19 patient within 14 days of symptom onset. She also has not had a close contact with a suspected COVID-19 patient within 14 days of symptom onset.   Yeyo is not a healthcare worker or a  and does not work in a healthcare facility. She does not live with a healthcare worker.   Triage Point(s) temporarily suspended for COVID-19 (Coronavirus) screening  Yeyo reported the following symptoms which were previously protocol referral points. These protocol referral points have temporarily been removed for purposes of COVID-19 (Coronavirus) screening.   Wheezing that keeps Yeyo from doing daily activities   Pertinent medical history  Yeyo does not get yeast infections when she takes antibiotics.   Yeyo needs a return to work/school note.   Weight: 140 lbs   Yeyo does not smoke or use smokeless tobacco.   Additional information as reported by the patient (free text): My primary doctor suggested I was being under-treated for asthma and may be the cause for constant coughing. The doctor also suggested I have a CAT scan because I have been coughing on and off for months. Right now I am coughing about every 5 minutes  but at  night it's almost constant.   Weight: 140 lbs  A synchronous phone visit was initiated by the provider for the following reason: discuss wheezing    MEDICATIONS: atorvastatin oral, metformin oral, fluticasone propionate inhalation, albuterol sulfate inhalation, ALLERGIES: NKDA  Clinician Response:  Dear Yeyo,   Based on the information you have provided, you do have symptoms that are consistent with Coronavirus (COVID-19).  The coronavirus causes mild to severe respiratory illness with the most common symptoms including fever, cough and difficulty breathing. Unfortunately, many viruses cause similar symptoms and it can be difficult to distinguish between viruses, especially in mild  cases, so we are presuming that anyone with cough or fever has coronavirus at this time.  Coronavirus/COVID-19 has reached the point of community spread in [place], meaning that we are finding the virus in people with no known exposure risk for raghavendra the virus. Given the increasing commonness of coronavirus in the community we are no longer testing patients who are not critically ill.  If you are a health care worker, you should refer to your employee health office for instructions about testing and returning to work.  For everyone else who has cough or fever, you should assume you are infected with coronavirus. Since you will not be tested but have symptoms that may be consistent with coronavirus, the CDC recommends you stay in self-isolation until these three things have happened:    You have had no fever for at least 72 hours (that is three full days of no fever without the use of medicine that reduces fevers)    AND   Other symptoms have improved (for example, when your cough or shortness of breath have improved)   AND   At least 7 days have passed since your symptoms first appeared.   How to Isolate:   Isolate yourself at home.  Do Not allow any visitors  Do Not go to work or school  Do Not go to [employer],  centers, shopping, or other public places.  Do Not shake hands.  Avoid close contact with others (hugging, kissing).   Protect Others:   Cover Your Mouth and Nose with a mask, disposable tissue or wash cloth to avoid spreading germs to others.  Wash your hands and face frequently with soap and water.   We know it can be scary to hear that you might have COVID-19. Our team can help track your symptoms and make sure you are doing ok over the next two weeks using a program called [email or web address] to keep in touch. When you receive an email from [email or web address], please consider enrolling in our monitoring program. There is no cost to you for monitoring. Here is a URL where you can learn more:  [email or web address]  Managing Symptoms:   At this time, we primarily recommend Tylenol (Acetaminophen) for fever or pain. If you have liver or kidney problems, contact your primary care provider for instructions on use of tylenol. Adults can take 650 mg (two 325 mg pills) by mouth every 4-6 hours as needed OR 1,000 mg (two 500 mg pills) every 8 hours as needed. MAXIMUM DAILY DOSE: 3,000mg. For children, refer to dosing on bottle based on age or weight.   If you develop significant shortness of breath that prevents you from doing normal activities, please call 911 or proceed to the nearest emergency room and alert them immediately that you have been in self-isolation for possible coronavirus.  If you have a higher risk medical condition such as cancer, heart failure, end stage renal disease on dialysis or have a transplant, please reach out to your specialist's clinic to advise them of your [facility] visit should you not improve within the next two days.   For more information about COVID19 and options for caring for yourself at home, please visit the CDC website at https://www.cdc.gov/coronavirus/2019-ncov/about/steps-when-sick.htmlFor more options for care at [facility], please visit our website at [email or web address]    Diagnosis: Cough  Diagnosis ICD: R05  Triage Notes: I reviewed the patient's history, verified their identity, and explained the Visit process.    needs , daughter helped with this phone visit. Prednisone helped in the past, recommended following up with their provider to further work on asthma.  Synchronous Triage: phone, status: completed, duration: 544 seconds  Prescription: prednisone 20 mg oral tablet 10 tablet, 5 days supply. Take 2 tablets by mouth 1 time per day for 5 days. Refills: 0, Refill as needed: no, Allow substitutions: yes  Prescription: benzonatate (Tessalon Perles) 100 mg oral capsule 45 capsule, 0 days supply. Take 1 capsule  by mouth 3 times per day as needed for cough. Refills: 0, Refill as needed: no, Allow substitutions: yes  Pharmacy: [id number] - [phone] - [address], [place] [address]

## 2020-03-28 NOTE — TELEPHONE ENCOUNTER
Reason for Call:  Other appointment    Detailed comments: Patient's daughter calling. They did [facility], it did not advise an appointment at this time daughter states that she would like a visit to discuss her asthma flare up and dosing for her medication. Please advise if an appointment is appropriate. Thank you.    Phone Number Patient can be reached at: [phone] (home)     Best Time: Anytime    Can we leave a detailed message on this number? YES    Call taken on 3/28/2020 at 1:16 PM by Jason Loyd

## 2020-03-30 ENCOUNTER — VIRTUAL VISIT (OUTPATIENT)
Dept: INTERNAL MEDICINE | Facility: CLINIC | Age: 60
End: 2020-03-30
Payer: COMMERCIAL

## 2020-03-30 DIAGNOSIS — J45.41 MODERATE PERSISTENT ASTHMA WITH ACUTE EXACERBATION: Primary | ICD-10-CM

## 2020-03-30 DIAGNOSIS — J45.20 MILD INTERMITTENT ASTHMA, UNSPECIFIED WHETHER COMPLICATED: Primary | ICD-10-CM

## 2020-03-30 DIAGNOSIS — J30.2 SEASONAL ALLERGIC RHINITIS, UNSPECIFIED TRIGGER: ICD-10-CM

## 2020-03-30 PROBLEM — E11.9 TYPE 2 DIABETES MELLITUS WITHOUT COMPLICATION, WITHOUT LONG-TERM CURRENT USE OF INSULIN (H): Status: ACTIVE | Noted: 2019-03-20

## 2020-03-30 PROBLEM — E78.5 HYPERLIPIDEMIA: Status: ACTIVE | Noted: 2019-03-20

## 2020-03-30 PROBLEM — J45.40 MODERATE PERSISTENT ASTHMA WITHOUT COMPLICATION: Chronic | Status: ACTIVE | Noted: 2020-03-30

## 2020-03-30 PROCEDURE — 99443 ZZC PHYSICIAN TELEPHONE EVALUATION 21-30 MIN: CPT | Performed by: NURSE PRACTITIONER

## 2020-03-30 RX ORDER — ALBUTEROL SULFATE 0.83 MG/ML
2.5 SOLUTION RESPIRATORY (INHALATION) EVERY 4 HOURS PRN
Qty: 1 BOX | Refills: 2 | Status: SHIPPED | OUTPATIENT
Start: 2020-03-30

## 2020-03-30 RX ORDER — PREDNISONE 10 MG/1
TABLET ORAL
Qty: 18 TABLET | Refills: 0 | Status: SHIPPED | OUTPATIENT
Start: 2020-03-30 | End: 2020-07-10

## 2020-03-30 RX ORDER — BUDESONIDE AND FORMOTEROL FUMARATE DIHYDRATE 80; 4.5 UG/1; UG/1
2 AEROSOL RESPIRATORY (INHALATION) 2 TIMES DAILY
Qty: 1 INHALER | Refills: 2 | Status: SHIPPED | OUTPATIENT
Start: 2020-03-30 | End: 2020-07-10

## 2020-03-30 RX ORDER — LORATADINE 10 MG/1
10 TABLET ORAL DAILY
Qty: 30 TABLET | Refills: 2 | Status: SHIPPED | OUTPATIENT
Start: 2020-03-30 | End: 2020-07-10

## 2020-03-30 NOTE — TELEPHONE ENCOUNTER
Patient should have asthma follow-up appointment, but this could be done over the phone/videchristine.  Has been seen several times for on-going cough with normal chest xray in February, hx of asthma.  Was most recently prescribed steroid burst on 3/28/20.  Possibly needs change to her daily medication to better control asthma symptoms, also CT has been previously recommended but patient/family declined at that time.  Kathie Tabares, CNP

## 2020-03-30 NOTE — TELEPHONE ENCOUNTER
See [facility] notes below.   Should patient make an office appointment?    Yeyo states her symptoms started gradually 1 month or more ago. After her symptoms started, they improved and then got worse again.   Her symptoms consist of a headache, wheezing, a cough, and malaise. She is experiencing mild difficulty breathing with activities but can speak normally in full sentences.   Symptom details     Cough: Yeyo coughs almost every minute and her cough is more bothersome at night. Phlegm comes into her throat when she coughs. She does not believe her cough is caused by post-nasal drip. The color of the phlegm is clear.     Wheezing: Yeyo has been diagnosed with asthma. The wheezing interferes with her normal daily activities.    Headache: Yeyo has not had the headache for 12 weeks or more. She states the headache is moderate (4-6 on a 10 point pain scale).      Yeyo denies having ear pain, rhinitis, teeth pain, fever, facial pain or pressure, myalgias, chills, sore throat, and nasal congestion. She also denies having a sinus infection within the past year, taking antibiotic medication for the symptoms, and having recent facial or sinus surgery in the past 60 days.   Precipitating events  She has not recently been exposed to someone with influenza. Yeyo has not been in close contact with any high risk individuals.   Pertinent COVID-19 (Coronavirus) information  Yeyo has not traveled internationally or to the areas where COVID-19 (Coronavirus) is widespread, including cruise ship travel in the last 14 days before the start of her symptoms.   Yeyo has not had a close contact with a laboratory-confirmed COVID-19 patient within 14 days of symptom onset. She also has not had a close contact with a suspected COVID-19 patient within 14 days of symptom onset.   Yeyo is not a healthcare worker or a  and does not work in a healthcare facility. She does not live with a healthcare worker.   Triage Point(s) temporarily  suspended for COVID-19 (Coronavirus) screening  Yeyo reported the following symptoms which were previously protocol referral points. These protocol referral points have temporarily been removed for purposes of COVID-19 (Coronavirus) screening.   Wheezing that keeps Yeyo from doing daily activities   Pertinent medical history  Yeyo does not get yeast infections when she takes antibiotics.   Yeyo needs a return to work/school note.   Weight: 140 lbs   Yeyo does not smoke or use smokeless tobacco.   Additional information as reported by the patient (free text): My primary doctor suggested I was being under-treated for asthma and may be the cause for constant coughing. The doctor also suggested I have a CAT scan because I have been coughing on and off for months. Right now I am coughing about every 5 minutes  but at  night it's almost constant.   Weight: 140 lbs  A synchronous phone visit was initiated by the provider for the following reason: discuss wheezing    MEDICATIONS: atorvastatin oral, metformin oral, fluticasone propionate inhalation, albuterol sulfate inhalation, ALLERGIES: NKDA  Clinician Response:  Dear Yeyo,   Based on the information you have provided, you do have symptoms that are consistent with Coronavirus (COVID-19).  The coronavirus causes mild to severe respiratory illness with the most common symptoms including fever, cough and difficulty breathing. Unfortunately, many viruses cause similar symptoms and it can be difficult to distinguish between viruses, especially in mild cases, so we are presuming that anyone with cough or fever has coronavirus at this time.  Coronavirus/COVID-19 has reached the point of community spread in [place], meaning that we are finding the virus in people with no known exposure risk for raghavendra the virus. Given the increasing commonness of coronavirus in the community we are no longer testing patients who are not critically ill.  If you are a health care worker, you  should refer to your employee health office for instructions about testing and returning to work.  For everyone else who has cough or fever, you should assume you are infected with coronavirus. Since you will not be tested but have symptoms that may be consistent with coronavirus, the CDC recommends you stay in self-isolation until these three things have happened:    You have had no fever for at least 72 hours (that is three full days of no fever without the use of medicine that reduces fevers)    AND   Other symptoms have improved (for example, when your cough or shortness of breath have improved)   AND   At least 7 days have passed since your symptoms first appeared.   How to Isolate:   Isolate yourself at home.  Do Not allow any visitors  Do Not go to work or school  Do Not go to [employer],  centers, shopping, or other public places.  Do Not shake hands.  Avoid close contact with others (hugging, kissing).   Protect Others:   Cover Your Mouth and Nose with a mask, disposable tissue or wash cloth to avoid spreading germs to others.  Wash your hands and face frequently with soap and water.   We know it can be scary to hear that you might have COVID-19. Our team can help track your symptoms and make sure you are doing ok over the next two weeks using a program called [employer] to keep in touch. When you receive an email from [employer], please consider enrolling in our monitoring program. There is no cost to you for monitoring. Here is a URL where you can learn more: [email or web address]  Managing Symptoms:   At this time, we primarily recommend Tylenol (Acetaminophen) for fever or pain. If you have liver or kidney problems, contact your primary care provider for instructions on use of tylenol. Adults can take 650 mg (two 325 mg pills) by mouth every 4-6 hours as needed OR 1,000 mg (two 500 mg pills) every 8 hours as needed. MAXIMUM DAILY DOSE: 3,000mg. For children, refer to dosing on bottle  based on age or weight.   If you develop significant shortness of breath that prevents you from doing normal activities, please call 911 or proceed to the nearest emergency room and alert them immediately that you have been in self-isolation for possible coronavirus.  If you have a higher risk medical condition such as cancer, heart failure, end stage renal disease on dialysis or have a transplant, please reach out to your specialist's clinic to advise them of your [facility] visit should you not improve within the next two days.   For more information about COVID19 and options for caring for yourself at home, please visit the CDC website at https://www.cdc.gov/coronavirus/2019-ncov/about/steps-when-sick.htmlFor more options for care at [facility], please visit our website at [email or web address]    Diagnosis: Cough        Nirmala ANGELESN, RN

## 2020-03-30 NOTE — TELEPHONE ENCOUNTER
TC, please assist patient with telephone visit with one of our virtual providers today for asthma follow up.  Nirmala ANGELESN, RN

## 2020-03-30 NOTE — PROGRESS NOTES
"Subjective     Yeyo Bradley is a 59 year old female who is being evaluated via a billable telephone visit.      The patient has been notified of following:     \"This telephone visit will be conducted via a call between you and your physician/provider. We have found that certain health care needs can be provided without the need for a physical exam.  This service lets us provide the care you need with a short phone conversation.  If a prescription is necessary we can send it directly to your pharmacy.  If lab work is needed we can place an order for that and you can then stop by our lab to have the test done at a later time.    If during the course of the call the physician/provider feels a telephone visit is not appropriate, you will not be charged for this service.\"     Physician has received verbal consent for a Telephone Visit from the patient? Yes    Yeyo Bradley complains of ASTHMA      HPI:      Yeyo Bradley is a 59-year-old female with a past medical history significant for asthma, hyperlipidemia and diabetes who presents for telephone visit (due to current pandemic) to discuss asthma.  On the phone today is patient, daughter, and Spanish .    Patient reports ongoing cough and wheezing for the last several months.  She reports history of asthma diagnosed at least 10 years ago.  She denies any formal pulmonary function testing that she can recall.  She is a lifelong non-smoker.  She does report a history of seasonal allergies, she is currently not taking any antihistamines.  Current asthma medications include Flovent 110 MCG and PRN albuterol HFA and nebs.    Patient has been seen several times for cough and wheezing in the last few months.  She denies any history of fever or chills with this.  No SOB or CP.  Chart review reveals she was seen on the following dates:    1. 1/24/2020 was seen reporting cough and wheezing for several weeks.  She was treated with azithromycin at that " time.  2. 2/3/2020, ongoing symptoms.  Had normal CXR at that time.   3. 2/5/2020, ongoing symptoms. Treated with prednisone 40 mg x 5 days and given rx for albuterol nebs.   4. 3/10/2020, ongoing symptoms.   Treated with prednisone 40 mg x 5 days.   5. 3/28/20, ongoing symptoms.   Treated with prednisone 40 mg x 5 days.     Patient reports that she is compliant with her daily medications including Flovent.  She is using albuterol approximately 4 times per day.  Coughing is worse at night.  Robitussin AC was not effective for cough.  She has mild improvement with Tessalon Perles.  Patient reports that with every prednisone burst she improves, but symptoms return shortly after finishing course of medication.      ALLERGIES  Patient has no known allergies.        Patient Active Problem List   Diagnosis     Hyperlipidemia     Type 2 diabetes mellitus without complication, without long-term current use of insulin (H)     Moderate persistent asthma without complication     History reviewed. No pertinent surgical history.    Social History     Tobacco Use     Smoking status: Never Smoker     Smokeless tobacco: Never Used   Substance Use Topics     Alcohol use: Never     Frequency: Never     Family History   Problem Relation Age of Onset     Glaucoma No family hx of      Macular Degeneration No family hx of          Current Outpatient Medications   Medication Sig Dispense Refill     albuterol (PROAIR HFA/PROVENTIL HFA/VENTOLIN HFA) 108 (90 Base) MCG/ACT inhaler Inhale 2 puffs into the lungs 4 times daily 1 Inhaler 3     albuterol (PROVENTIL) (2.5 MG/3ML) 0.083% neb solution Take 1 vial (2.5 mg) by nebulization every 4 hours as needed for shortness of breath / dyspnea, wheezing or other (cough) 1 Box 2     atorvastatin (LIPITOR) 20 MG tablet Take 1.5 tablets (30 mg) by mouth at bedtime.       benzonatate (TESSALON) 200 MG capsule Take 1 capsule (200 mg) by mouth 3 times daily as needed for cough 30 capsule 0      budesonide-formoterol (SYMBICORT) 80-4.5 MCG/ACT Inhaler Inhale 2 puffs into the lungs 2 times daily . To replace Flovent inhaler. 1 Inhaler 2     loratadine (CLARITIN) 10 MG tablet Take 1 tablet (10 mg) by mouth daily 30 tablet 2     metFORMIN (GLUCOPHAGE) 500 MG tablet Take 1 tablet (500 mg) by mouth once a day with breakfast. 90 tablet 3     order for DME Glucometer, lancets and test strips. brand as covered by insurance. 1 each 11     order for DME Equipment being ordered: Nebulizer and mask and tubing supplies 1 Device 0     order for DME Equipment being ordered: Glucometer per insurance preference, lancets, test strips.  Patient to check sugars 1 times daily, 90 day supply for test strips and lancets, refill 3 times 1 Device 0     predniSONE (DELTASONE) 10 MG tablet Take 3 tablets (30 mg) by mouth daily for 3 days, THEN 2 tablets (20 mg) daily for 3 days, THEN 1 tablet (10 mg) daily for 3 days. Start when you run out of the prednisone you already have at home. 18 tablet 0     predniSONE (DELTASONE) 20 MG tablet Take 2 tablets (40 mg) by mouth daily 10 tablet 0     No Known Allergies    Reviewed and updated as needed this visit by Provider  Tobacco  Allergies  Meds  Problems  Med Hx  Surg Hx  Fam Hx         Review of Systems   ROS: 10 point ROS neg other than the symptoms noted above in the HPI.       Objective   Reported vitals:  There were no vitals taken for this visit.     CXR 2/3/2020 (Per Care Everywhere- [facility]):    IMPRESSION: Normal heart size and pulmonary vascularity. The lungs are clear. No  evidence of significant pleural fluid. Mild hypertrophic and degenerative  changes in the spine. No findings identified to account for the reported  symptoms.        Assessment/Plan:  1. Moderate persistent asthma with acute exacerbation  Asthma not well controlled, with current exacerbation.   Likely needs longer treatment with prednisone taper to recover as well as a step up in her daily asthma  medication.  She is currently on prednisone 40 mg daily x5 days, this was started on 3/28.  I will send in a prescription for prednisone that she can start once her current supply runs out- this will be for a taper of 30 mg x 3 days, 20 mg x 3 days and 10 mg x 3 days.  She will continue albuterol as needed, refill was sent on her nebulizer solution. Will have her stop Flovent and start Symbicort (it looked like she would have best insurance coverage with this med, also considered Breo Ellipta, Advair or Dulera).  Will also have her start daily 2nd gen antihistamine- rx for Claritin sent.   Referral was placed to pulmonology.  Both patient and daughter were in agreement with this plan and had no questions at the end of visit.  All information was relayed via Guinean .  I asked that she follow-up in 1 month.    - budesonide-formoterol (SYMBICORT) 80-4.5 MCG/ACT Inhaler; Inhale 2 puffs into the lungs 2 times daily . To replace Flovent inhaler.  Dispense: 1 Inhaler; Refill: 2  - predniSONE (DELTASONE) 10 MG tablet; Take 3 tablets (30 mg) by mouth daily for 3 days, THEN 2 tablets (20 mg) daily for 3 days, THEN 1 tablet (10 mg) daily for 3 days. Start when you run out of the prednisone you already have at home.  Dispense: 18 tablet; Refill: 0  - loratadine (CLARITIN) 10 MG tablet; Take 1 tablet (10 mg) by mouth daily  Dispense: 30 tablet; Refill: 2  - PULMONARY MEDICINE REFERRAL  - albuterol (PROVENTIL) (2.5 MG/3ML) 0.083% neb solution; Take 1 vial (2.5 mg) by nebulization every 4 hours as needed for shortness of breath / dyspnea, wheezing or other (cough)  Dispense: 1 Box; Refill: 2    2. Seasonal allergic rhinitis, unspecified trigger  As noted above.   - loratadine (CLARITIN) 10 MG tablet; Take 1 tablet (10 mg) by mouth daily  Dispense: 30 tablet; Refill: 2    Return in about 4 weeks (around 4/27/2020) for Follow-up.      Phone call duration:  30 minutes    Kathie Tabares, CNP

## 2020-03-31 NOTE — TELEPHONE ENCOUNTER
RECORDS RECEIVED FROM: Internal   DATE RECEIVED: 6.22.2020   NOTES STATUS DETAILS   OFFICE NOTE from referring provider Internal 3.30.2020 ZANDRA Leo   OFFICE NOTE from other specialist Internal 2.5.2020 Kristen Kehr, FV  1.25.2020 ZANDRA Mendieta   DISCHARGE SUMMARY from hospital N/A    DISCHARGE REPORT from the ER N/A    MEDICATION LIST Internal    IMAGING  (NEED IMAGES AND REPORTS)     CT SCAN N/A    CHEST XRAY (CXR) In process 2.3.2020  10.28.18   TESTS     PULMONARY FUNCTION TESTING (PFT) In process Scheduled      Action  3.31.2020 11:49 AM   Action Taken Requested images from [facility]

## 2020-05-26 ENCOUNTER — DOCUMENTATION ONLY (OUTPATIENT)
Dept: CARE COORDINATION | Facility: CLINIC | Age: 60
End: 2020-05-26

## 2020-06-15 ENCOUNTER — APPOINTMENT (OUTPATIENT)
Dept: INTERPRETER SERVICES | Facility: CLINIC | Age: 60
End: 2020-06-15
Payer: COMMERCIAL

## 2020-06-22 ENCOUNTER — PRE VISIT (OUTPATIENT)
Dept: PULMONOLOGY | Facility: CLINIC | Age: 60
End: 2020-06-22

## 2020-07-10 ENCOUNTER — OFFICE VISIT (OUTPATIENT)
Dept: FAMILY MEDICINE | Facility: CLINIC | Age: 60
End: 2020-07-10
Payer: COMMERCIAL

## 2020-07-10 VITALS
BODY MASS INDEX: 30.12 KG/M2 | TEMPERATURE: 98.3 F | SYSTOLIC BLOOD PRESSURE: 118 MMHG | DIASTOLIC BLOOD PRESSURE: 74 MMHG | WEIGHT: 154.2 LBS | HEART RATE: 69 BPM

## 2020-07-10 DIAGNOSIS — E11.9 TYPE 2 DIABETES MELLITUS WITHOUT COMPLICATION, WITHOUT LONG-TERM CURRENT USE OF INSULIN (H): ICD-10-CM

## 2020-07-10 DIAGNOSIS — J45.40 MODERATE PERSISTENT ASTHMA WITHOUT COMPLICATION: Primary | Chronic | ICD-10-CM

## 2020-07-10 DIAGNOSIS — E78.5 HYPERLIPIDEMIA, UNSPECIFIED HYPERLIPIDEMIA TYPE: ICD-10-CM

## 2020-07-10 PROCEDURE — 99214 OFFICE O/P EST MOD 30 MIN: CPT | Performed by: NURSE PRACTITIONER

## 2020-07-10 RX ORDER — BUDESONIDE AND FORMOTEROL FUMARATE DIHYDRATE 80; 4.5 UG/1; UG/1
2 AEROSOL RESPIRATORY (INHALATION) 2 TIMES DAILY
Qty: 1 INHALER | Refills: 2 | Status: SHIPPED | OUTPATIENT
Start: 2020-07-10 | End: 2020-12-02

## 2020-07-10 ASSESSMENT — ENCOUNTER SYMPTOMS
CHILLS: 0
FEVER: 0
SHORTNESS OF BREATH: 0
COUGH: 0

## 2020-07-10 NOTE — LETTER
My Asthma Action Plan    Name: Yeyo Bradley   YOB: 1960  Date: 7/10/2020   My doctor: ANTONI Marin CNP   My clinic: [facility]        My Control Medicine: Budesonide + formoterol (Symbicort HFA) -  80/4.5 mcg BID  My Rescue Medicine: Albuterol (Proair/Ventolin/Proventil HFA) 2-4 puffs EVERY 4 HOURS as needed. Use a spacer if recommended by your provider.   My Asthma Severity:   Moderate Persistent  Know your asthma triggers: exercise or sports and cold air  exercise or sports  cold air            GREEN ZONE   Good Control    I feel good    No cough or wheeze    Can work, sleep and play without asthma symptoms       Take your asthma control medicine every day.     1. If exercise triggers your asthma, take your rescue medication    15 minutes before exercise or sports, and    During exercise if you have asthma symptoms  2. Spacer to use with inhaler: If you have a spacer, make sure to use it with your inhaler             YELLOW ZONE Getting Worse  I have ANY of these:    I do not feel good    Cough or wheeze    Chest feels tight    Wake up at night   1. Keep taking your Green Zone medications  2. Start taking your rescue medicine:    every 20 minutes for up to 1 hour. Then every 4 hours for 24-48 hours.  3. If you stay in the Yellow Zone for more than 12-24 hours, contact your doctor.  4. If you do not return to the Green Zone in 12-24 hours or you get worse, start taking your oral steroid medicine if prescribed by your provider.           RED ZONE Medical Alert - Get Help  I have ANY of these:    I feel awful    Medicine is not helping    Breathing getting harder    Trouble walking or talking    Nose opens wide to breathe       1. Take your rescue medicine NOW  2. If your provider has prescribed an oral steroid medicine, start taking it NOW  3. Call your doctor NOW  4. If you are still in the Red Zone after 20 minutes and you have not reached your doctor:    Take your rescue  medicine again and    Call 911 or go to the emergency room right away    See your regular doctor within 2 weeks of an Emergency Room or Urgent Care visit for follow-up treatment.          Annual Reminders:  Meet with Asthma Educator,  Flu Shot in the Fall, consider Pneumonia Vaccination for patients with asthma (aged 19 and older).    Pharmacy:    [id number] [address] [id number] [place], [place] - [address]    Electronically signed by ANTONI Marin CNP   Date: 07/10/20                      Asthma Triggers  How To Control Things That Make Your Asthma Worse    Triggers are things that make your asthma worse.  Look at the list below to help you find your triggers and what you can do about them.  You can help prevent asthma flare-ups by staying away from your triggers.      Trigger                                                          What you can do   Cigarette Smoke  Tobacco smoke can make asthma worse. Do not allow smoking in your home, car or around you.  Be sure no one smokes at a child s day care or school.  If you smoke, ask your health care provider for ways to help you quit.  Ask family members to quit too.  Ask your health care provider for a referral to Quit Plan to help you quit smoking, or call 4-112-219-PLAN.     Colds, Flu, Bronchitis  These are common triggers of asthma. Wash your hands often.  Don t touch your eyes, nose or mouth.  Get a flu shot every year.     Dust Mites  These are tiny bugs that live in cloth or carpet. They are too small to see. Wash sheets and blankets in hot water every week.   Encase pillows and mattress in dust mite proof covers.  Avoid having carpet if you can. If you have carpet, vacuum weekly.   Use a dust mask and HEPA vacuum.   Pollen and Outdoor Mold  Some people are allergic to trees, grass, or weed pollen, or molds. Try to keep your windows closed.  Limit  time out doors when pollen count is high.   Ask you health care provider about taking medicine during allergy season.     Animal Dander  Some people are allergic to skin flakes, urine or saliva from pets with fur or feathers. Keep pets with fur or feathers out of your home.    If you can t keep the pet outdoors, then keep the pet out of your bedroom.  Keep the bedroom door closed.  Keep pets off cloth furniture and away from stuffed toys.     Mice, Rats, and Cockroaches   Some people are allergic to the waste from these pests.   Cover food and garbage.  Clean up spills and food crumbs.  Store grease in the refrigerator.   Keep food out of the bedroom.   Indoor Mold  This can be a trigger if your home has high moisture. Fix leaking faucets, pipes, or other sources of water.   Clean moldy surfaces.  Dehumidify basement if it is damp and smelly.   Smoke, Strong Odors, and Sprays  These can reduce air quality. Stay away from strong odors and sprays, such as perfume, powder, hair spray, paints, smoke incense, paint, cleaning products, candles and new carpet.   Exercise or Sports  Some people with asthma have this trigger. Be active!  Ask your doctor about taking medicine before sports or exercise to prevent symptoms.    Warm up for 5-10 minutes before and after sports or exercise.     Other Triggers of Asthma  Cold air:  Cover your nose and mouth with a scarf.  Sometimes laughing or crying can be a trigger.  Some medicines and food can trigger asthma.

## 2020-07-10 NOTE — PROGRESS NOTES
Subjective     Yeyo Bradley is a 59 year old female who presents to clinic today for the following health issues:    HPI     Asthma Follow-Up    Was ACT completed today?    Yes    ACT Total Scores 7/10/2020   ACT TOTAL SCORE (Goal Greater than or Equal to 20) 23   In the past 12 months, how many times did you visit the emergency room for your asthma without being admitted to the hospital? 0   In the past 12 months, how many times were you hospitalized overnight because of your asthma? 0         How many days per week do you miss taking your asthma controller medication?  0    Please describe any recent triggers for your asthma: exercise or sports and cold air    Have you had any Emergency Room Visits, Urgent Care Visits, or Hospital Admissions since your last office visit?  No      How many servings of fruits and vegetables do you eat daily?  0-1    On average, how many sweetened beverages do you drink each day (Examples: soda, juice, sweet tea, etc.  Do NOT count diet or artificially sweetened beverages)?   0    How many days per week do you exercise enough to make your heart beat faster? 7    How many minutes a day do you exercise enough to make your heart beat faster? 30 - 60    How many days per week do you miss taking your medication? 0      Asthma-- Reports she feels her asthma is much better controlled since switching to the Symbicort from Flovent.  She had an appt with Pulmonology but states she cancelled this when her asthma improved.  She is not interested in seeing Pulm at this time.    HLD-- reports she has been taking the Atorvastatin, however often forgets to take this in the evening.      T2DM-- reports she is wanting to try to decrease the metformin from BID to daily.  A1C 6.3 (2/2020).          Patient Active Problem List   Diagnosis     Hyperlipidemia     Type 2 diabetes mellitus without complication, without long-term current use of insulin (H)     Moderate persistent asthma without  complication     History reviewed. No pertinent surgical history.    Social History     Tobacco Use     Smoking status: Never Smoker     Smokeless tobacco: Never Used   Substance Use Topics     Alcohol use: Never     Frequency: Never     Family History   Problem Relation Age of Onset     Glaucoma No family hx of      Macular Degeneration No family hx of          Current Outpatient Medications   Medication Sig Dispense Refill     albuterol (PROAIR HFA/PROVENTIL HFA/VENTOLIN HFA) 108 (90 Base) MCG/ACT inhaler Inhale 2 puffs into the lungs 4 times daily 1 Inhaler 3     atorvastatin (LIPITOR) 40 MG tablet Take 1 tablet (40 mg) by mouth daily       budesonide-formoterol (SYMBICORT) 80-4.5 MCG/ACT Inhaler Inhale 2 puffs into the lungs 2 times daily . To replace Flovent inhaler. 1 Inhaler 2     metFORMIN (GLUCOPHAGE) 500 MG tablet Take 1 tablet (500 mg) by mouth daily (with breakfast) Take 1 tablet (500 mg) by mouth twice a day with meals. 90 tablet 0     order for DME Glucometer, lancets and test strips. brand as covered by insurance. 1 each 11     order for DME Equipment being ordered: Glucometer per insurance preference, lancets, test strips.  Patient to check sugars 1 times daily, 90 day supply for test strips and lancets, refill 3 times 1 Device 0     albuterol (PROVENTIL) (2.5 MG/3ML) 0.083% neb solution Take 1 vial (2.5 mg) by nebulization every 4 hours as needed for shortness of breath / dyspnea, wheezing or other (cough) (Patient not taking: Reported on 7/10/2020) 1 Box 2     order for DME Equipment being ordered: Nebulizer and mask and tubing supplies (Patient not taking: Reported on 7/10/2020) 1 Device 0     No Known Allergies  Recent Labs   Lab Test 01/23/20  1249   A1C 5.9*   *   HDL 51   TRIG 221*   ALT 33   CR 0.58   GFRESTIMATED >90   GFRESTBLACK >90   POTASSIUM 4.4   TSH 0.71        Reviewed and updated as needed this visit by Provider  Tobacco  Allergies  Meds  Problems  Med Hx  Surg Hx  Fam  Hx         Review of Systems   Constitutional: Negative for chills and fever.   Respiratory: Negative for cough and shortness of breath.    Cardiovascular: Negative for chest pain.            Objective    /74   Pulse 69   Temp 98.3  F (36.8  C) (Oral)   Wt 69.9 kg (154 lb 3.2 oz)   BMI 30.12 kg/m    Body mass index is 30.12 kg/m .  Physical Exam  Vitals signs reviewed.   Constitutional:       Appearance: She is well-developed.   HENT:      Head: Normocephalic.   Cardiovascular:      Rate and Rhythm: Normal rate and regular rhythm.   Pulmonary:      Effort: Pulmonary effort is normal.      Breath sounds: Normal breath sounds.   Skin:     General: Skin is warm and dry.   Neurological:      Mental Status: She is alert and oriented to person, place, and time.   Psychiatric:         Mood and Affect: Mood normal.         Behavior: Behavior normal.          Diagnostic Test Results:  Labs reviewed in Epic        Assessment & Plan     1. Moderate persistent asthma without complication  - stable. Albuterol prn.  - budesonide-formoterol (SYMBICORT) 80-4.5 MCG/ACT Inhaler; Inhale 2 puffs into the lungs 2 times daily . To replace Flovent inhaler.  Dispense: 1 Inhaler; Refill: 2    2. Hyperlipidemia, unspecified hyperlipidemia type  - ok to take Atorvastatin 40 mg daily during the day as she is forgetting to take in the evening.   - Lipid panel reflex to direct LDL Fasting; Future    3. Type 2 diabetes mellitus without complication, without long-term current use of insulin (H)  - will trial decreasing Metformin to daily instead of BID.  Discussed importance of diet and exercise.  Will recheck labs in 3 months.   - metFORMIN (GLUCOPHAGE) 500 MG tablet; Take 1 tablet (500 mg) by mouth daily (with breakfast) Take 1 tablet (500 mg) by mouth twice a day with meals.  Dispense: 90 tablet; Refill: 0  - Comprehensive metabolic panel; Future  - Hemoglobin A1c; Future       Return in about 3 months (around 10/10/2020) for HLD,  Asthma, Diabetes.    ANTONI Marin [facility]

## 2020-07-10 NOTE — Clinical Note
Please abstract physical, and A1C from care everywhere. They are in chart but not satisfying measure

## 2020-07-11 ASSESSMENT — ASTHMA QUESTIONNAIRES: ACT_TOTALSCORE: 23

## 2020-09-30 DIAGNOSIS — E78.5 HYPERLIPIDEMIA, UNSPECIFIED HYPERLIPIDEMIA TYPE: ICD-10-CM

## 2020-09-30 DIAGNOSIS — E11.9 TYPE 2 DIABETES MELLITUS WITHOUT COMPLICATION, WITHOUT LONG-TERM CURRENT USE OF INSULIN (H): ICD-10-CM

## 2020-09-30 LAB
ALBUMIN SERPL-MCNC: 4 G/DL (ref 3.4–5)
ALP SERPL-CCNC: 119 U/L (ref 40–150)
ALT SERPL W P-5'-P-CCNC: 43 U/L (ref 0–50)
ANION GAP SERPL CALCULATED.3IONS-SCNC: 6 MMOL/L (ref 3–14)
AST SERPL W P-5'-P-CCNC: 29 U/L (ref 0–45)
BILIRUB SERPL-MCNC: 0.6 MG/DL (ref 0.2–1.3)
BUN SERPL-MCNC: 18 MG/DL (ref 7–30)
CALCIUM SERPL-MCNC: 8.9 MG/DL (ref 8.5–10.1)
CHLORIDE SERPL-SCNC: 106 MMOL/L (ref 94–109)
CHOLEST SERPL-MCNC: 200 MG/DL
CO2 SERPL-SCNC: 27 MMOL/L (ref 20–32)
CREAT SERPL-MCNC: 0.68 MG/DL (ref 0.52–1.04)
GFR SERPL CREATININE-BSD FRML MDRD: >90 ML/MIN/{1.73_M2}
GLUCOSE SERPL-MCNC: 126 MG/DL (ref 70–99)
HBA1C MFR BLD: 6.4 % (ref 0–5.6)
HDLC SERPL-MCNC: 61 MG/DL
LDLC SERPL CALC-MCNC: 115 MG/DL
NONHDLC SERPL-MCNC: 139 MG/DL
POTASSIUM SERPL-SCNC: 3.7 MMOL/L (ref 3.4–5.3)
PROT SERPL-MCNC: 7.7 G/DL (ref 6.8–8.8)
SODIUM SERPL-SCNC: 139 MMOL/L (ref 133–144)
TRIGL SERPL-MCNC: 120 MG/DL

## 2020-09-30 PROCEDURE — 80061 LIPID PANEL: CPT | Performed by: NURSE PRACTITIONER

## 2020-09-30 PROCEDURE — 83036 HEMOGLOBIN GLYCOSYLATED A1C: CPT | Performed by: NURSE PRACTITIONER

## 2020-09-30 PROCEDURE — 80053 COMPREHEN METABOLIC PANEL: CPT | Performed by: NURSE PRACTITIONER

## 2020-09-30 PROCEDURE — 36415 COLL VENOUS BLD VENIPUNCTURE: CPT | Performed by: NURSE PRACTITIONER

## 2020-10-28 ENCOUNTER — VIRTUAL VISIT (OUTPATIENT)
Dept: FAMILY MEDICINE | Facility: CLINIC | Age: 60
End: 2020-10-28
Payer: COMMERCIAL

## 2020-10-28 DIAGNOSIS — E78.5 HYPERLIPIDEMIA, UNSPECIFIED HYPERLIPIDEMIA TYPE: ICD-10-CM

## 2020-10-28 DIAGNOSIS — J45.40 MODERATE PERSISTENT ASTHMA WITHOUT COMPLICATION: Chronic | ICD-10-CM

## 2020-10-28 DIAGNOSIS — E11.9 TYPE 2 DIABETES MELLITUS WITHOUT COMPLICATION, WITHOUT LONG-TERM CURRENT USE OF INSULIN (H): Primary | ICD-10-CM

## 2020-10-28 PROCEDURE — 99214 OFFICE O/P EST MOD 30 MIN: CPT | Mod: TEL | Performed by: NURSE PRACTITIONER

## 2020-10-28 RX ORDER — ATORVASTATIN CALCIUM 40 MG/1
40 TABLET, FILM COATED ORAL DAILY
Qty: 90 TABLET | Refills: 1 | Status: SHIPPED | OUTPATIENT
Start: 2020-10-28 | End: 2021-03-25

## 2020-10-28 NOTE — PROGRESS NOTES
"Subjective     Yeyo Bradley is a 60 year old female who presents to clinic today for the following health issues:    HPI         Diabetes Follow-up      How often are you checking your blood sugar? { :152940}    What concerns do you have today about your diabetes? { :596881::\"None\"}     Do you have any of these symptoms? (Select all that apply)  { :058339}      BP Readings from Last 2 Encounters:   07/10/20 118/74   02/05/20 113/73     Hemoglobin A1C (%)   Date Value   09/30/2020 6.4 (H)   02/03/2020 6.3 (A)     LDL Cholesterol Calculated (mg/dL)   Date Value   09/30/2020 115 (H)   01/23/2020 114 (H)       {Reference  Diabetes Management Resources :315851}    {Reference  Diabetes Log - 7 days :636794}    Hyperlipidemia Follow-Up      Are you regularly taking any medication or supplement to lower your cholesterol?   { :863798}    Are you having muscle aches or other side effects that you think could be caused by your cholesterol lowering medication?  { :729241}    Asthma Follow-Up    Was ACT completed today?  { :327890}    How many servings of fruits and vegetables do you eat daily?  { :327558}    On average, how many sweetened beverages do you drink each day (Examples: soda, juice, sweet tea, etc.  Do NOT count diet or artificially sweetened beverages)?   { 1-11:031659}    How many days per week do you exercise enough to make your heart beat faster? { :709731}    How many minutes a day do you exercise enough to make your heart beat faster? { :888342}    How many days per week do you miss taking your medication? {0-7 :174541}    {additonal problems for provider to add (Optional):248132}    Review of Systems   {ROS COMP (Optional):249037}      Objective    There were no vitals taken for this visit.  There is no height or weight on file to calculate BMI.  Physical Exam   {Exam List (Optional):738242}    {Diagnostic Test Results (Optional):218311}        {PROVIDER CHARTING PREFERENCE:850409}    "

## 2020-10-28 NOTE — PROGRESS NOTES
"Yeyo Bradley is a 60 year old female who is being evaluated via a billable video visit.      The patient has been notified of following:     \"This video visit will be conducted via a call between you and your physician/provider. We have found that certain health care needs can be provided without the need for an in-person physical exam.  This service lets us provide the care you need with a video conversation.  If a prescription is necessary we can send it directly to your pharmacy.  If lab work is needed we can place an order for that and you can then stop by our lab to have the test done at a later time.    Video visits are billed at different rates depending on your insurance coverage.  Please reach out to your insurance provider with any questions.    If during the course of the call the physician/provider feels a video visit is not appropriate, you will not be charged for this service.\"    Patient has given verbal consent for Video visit? {YES-NO  Default Yes:4444::\"Yes\"}  How would you like to obtain your AVS? {AVS Preference:047031}  If you are dropped from the video visit, the video invite should be resent to: {video visit invitation:346573}  Will anyone else be joining your video visit? {:051492}  {If patient encounters technical issues they should call [phone] :892003}    Subjective     Yeyo Bradley is a 60 year old female who presents today via video visit for the following health issues:    HPI     Diabetes Follow-up      How often are you checking your blood sugar? { :189179}    What concerns do you have today about your diabetes? { :605592::\"None\"}     Do you have any of these symptoms? (Select all that apply)  { :886788}      BP Readings from Last 2 Encounters:   07/10/20 118/74   02/05/20 113/73     Hemoglobin A1C (%)   Date Value   09/30/2020 6.4 (H)   02/03/2020 6.3 (A)     LDL Cholesterol Calculated (mg/dL)   Date Value   09/30/2020 115 (H)   01/23/2020 114 (H)       {Reference  " "Diabetes Management Resources :635598}    {Reference  Diabetes Log - 7 days :775308}    Hyperlipidemia Follow-Up      Are you regularly taking any medication or supplement to lower your cholesterol?   { :999099}    Are you having muscle aches or other side effects that you think could be caused by your cholesterol lowering medication?  { :203843}    Asthma Follow-Up    Was ACT completed today?  { :148699}    How many servings of fruits and vegetables do you eat daily?  { :842355}    On average, how many sweetened beverages do you drink each day (Examples: soda, juice, sweet tea, etc.  Do NOT count diet or artificially sweetened beverages)?   { 1-11:255760}    How many days per week do you exercise enough to make your heart beat faster? { :967878}    How many minutes a day do you exercise enough to make your heart beat faster? { :431437}    How many days per week do you miss taking your medication? {0-7 :230184}    {PEDS Chronic and Acute Problems (Optional):102145}     Video Start Time: {video visit start/end time for provider to select:811525}    {additonal problems for provider to add (Optional):109809}    Review of Systems   {ROS COMP (Optional):887449}      Objective           Vitals:  No vitals were obtained today due to virtual visit.    Physical Exam     {video visit exam brief selected:546264::\"GENERAL: Healthy, alert and no distress\",\"EYES: Eyes grossly normal to inspection.  No discharge or erythema, or obvious scleral/conjunctival abnormalities.\",\"RESP: No audible wheeze, cough, or visible cyanosis.  No visible retractions or increased work of breathing.  \",\"SKIN: Visible skin clear. No significant rash, abnormal pigmentation or lesions.\",\"NEURO: Cranial nerves grossly intact.  Mentation and speech appropriate for age.\",\"PSYCH: Mentation appears normal, affect normal/bright, judgement and insight intact, normal speech and appearance well-groomed.\"}      {Diagnostic Test Results (Optional):074163}    " "    {PROVIDER CHARTING PREFERENCE:067200}      Video-Visit Details    Type of service:  Video Visit    Video End Time:{video visit start/end time for provider to select:508856}    Originating Location (pt. Location): {video visit patient location:317096::\"Home\"}    Distant Location (provider location[facility]     Platform used for Video Visit: {Virtual Visit Platforms:518050::\"Granify\"}          "

## 2020-10-28 NOTE — PROGRESS NOTES
"Yeyo Bradley is a 60 year old female who is being evaluated via a billable telephone visit.      The patient has been notified of following:     \"This telephone visit will be conducted via a call between you and your physician/provider. We have found that certain health care needs can be provided without the need for a physical exam.  This service lets us provide the care you need with a short phone conversation.  If a prescription is necessary we can send it directly to your pharmacy.  If lab work is needed we can place an order for that and you can then stop by our lab to have the test done at a later time.    Telephone visits are billed at different rates depending on your insurance coverage. During this emergency period, for some insurers they may be billed the same as an in-person visit.  Please reach out to your insurance provider with any questions.    If during the course of the call the physician/provider feels a telephone visit is not appropriate, you will not be charged for this service.\"    Patient has given verbal consent for Telephone visit?  Yes    What phone number would you like to be contacted at? [phone]     How would you like to obtain your AVS? Soraida Cote     Yeyo Bradley is a 60 year old female who presents via phone visit today for the following health issues:    HPI     Diabetes Follow-up  At last visit we decreased Metformin from BID to daily.  A1C 6.4.     How often are you checking your blood sugar? Every 3 days    What concerns do you have today about your diabetes? None     Do you have any of these symptoms? (Select all that apply)  No numbness or tingling in feet.  No redness, sores or blisters on feet.  No complaints of excessive thirst.  No reports of blurry vision.  No significant changes to weight.      BP Readings from Last 2 Encounters:   07/10/20 118/74   02/05/20 113/73     Hemoglobin A1C (%)   Date Value   09/30/2020 6.4 (H)   02/03/2020 6.3 (A)     LDL " Cholesterol Calculated (mg/dL)   Date Value   09/30/2020 115 (H)   01/23/2020 114 (H)     Recent Labs   Lab Test 09/30/20  0750 01/23/20  1249   CHOL 200* 209*   HDL 61 51   * 114*   TRIG 120 221*           Hyperlipidemia Follow-Up      Are you regularly taking any medication or supplement to lower your cholesterol?   Yes- lipitor    Are you having muscle aches or other side effects that you think could be caused by your cholesterol lowering medication?  No    Asthma Follow-Up    Was ACT completed today?  No      Do you have a cough?  No    Are you experiencing any wheezing in your chest?  No    Do you have any shortness of breath?  No     How often are you using a short-acting (rescue) inhaler or nebulizer, such as Albuterol?  Never    How many days per week do you miss taking your asthma controller medication?  0    Please describe any recent triggers for your asthma: None    Have you had any Emergency Room Visits, Urgent Care Visits, or Hospital Admissions since your last office visit?  No      How many servings of fruits and vegetables do you eat daily?  2-3    On average, how many sweetened beverages do you drink each day (Examples: soda, juice, sweet tea, etc.  Do NOT count diet or artificially sweetened beverages)?   0    How many days per week do you exercise enough to make your heart beat faster? Walks around neighborhood 3-4 times per week    How many minutes a day do you exercise enough to make your heart beat faster? 20-30    How many days per week do you miss taking your medication? 0         Review of Systems   Constitutional: Negative for chills and fever.   Respiratory: Negative for cough and shortness of breath.    Cardiovascular: Negative for chest pain.   Musculoskeletal: Negative for myalgias.   Neurological: Negative for numbness and paresthesias.             Objective          Vitals:  No vitals were obtained today due to virtual visit.    healthy, alert and no distress  PSYCH: Alert and  oriented times 3; coherent speech, normal   rate and volume, able to articulate logical thoughts, able   to abstract reason, no tangential thoughts, no hallucinations   or delusions  Her affect is normal  RESP: No cough, no audible wheezing, able to talk in full sentences  Remainder of exam unable to be completed due to telephone visits          Assessment/Plan:    Assessment & Plan     1. Type 2 diabetes mellitus without complication, without long-term current use of insulin (H)  - stable.  A1C 6.4.  - continue Metformin 500 mg daily    2. Hyperlipidemia, unspecified hyperlipidemia type  - stable  - atorvastatin (LIPITOR) 40 MG tablet; Take 1 tablet (40 mg) by mouth daily  Dispense: 90 tablet; Refill: 1    3. Moderate persistent asthma without complication  - stable with Symbicort BID and Albuterol prn         Return in about 6 months (around 4/28/2021) for Physical Exam.    [facility]    Phone call duration:  12 minutes

## 2020-10-29 ASSESSMENT — ENCOUNTER SYMPTOMS
NUMBNESS: 0
FEVER: 0
COUGH: 0
CHILLS: 0
SHORTNESS OF BREATH: 0
MYALGIAS: 0
PARESTHESIAS: 0

## 2020-11-22 ENCOUNTER — HEALTH MAINTENANCE LETTER (OUTPATIENT)
Age: 60
End: 2020-11-22

## 2020-11-30 DIAGNOSIS — J45.40 MODERATE PERSISTENT ASTHMA WITHOUT COMPLICATION: Chronic | ICD-10-CM

## 2020-12-01 NOTE — TELEPHONE ENCOUNTER
"Requested Prescriptions   Pending Prescriptions Disp Refills     budesonide-formoterol (SYMBICORT) 80-4.5 MCG/ACT Inhaler 1 Inhaler 2     Sig: Inhale 2 puffs into the lungs 2 times daily . To replace Flovent inhaler.       Long-Acting Beta Agonist Inhalers Protocol  Failed - 11/30/2020  8:52 AM        Failed - Order for Serevent, Striverdi, or Foradil and pt has steroid inhaler        Passed - Patient is age 12 or older        Passed - Asthma control assessment score within normal limits in last 6 months     Please review ACT score.           Passed - Medication is active on med list        Passed - Recent (6 mo) or future (30 days) visit within the authorizing provider's specialty     Patient had office visit in the last 6 months or has a visit in the next 30 days with authorizing provider or within the authorizing provider's specialty.  See \"Patient Info\" tab in inbasket, or \"Choose Columns\" in Meds & Orders section of the refill encounter.           Inhaled Steroids Protocol Passed - 11/30/2020  8:52 AM        Passed - Patient is age 12 or older        Passed - Asthma control assessment score within normal limits in last 6 months     Please review ACT score.           Passed - Medication is active on med list        Passed - Recent (6 mo) or future (30 days) visit within the authorizing provider's specialty     Patient had office visit in the last 6 months or has a visit in the next 30 days with authorizing provider or within the authorizing provider's specialty.  See \"Patient Info\" tab in inbasket, or \"Choose Columns\" in Meds & Orders section of the refill encounter.               Nirmala ANGELESN, RN    "

## 2020-12-02 ENCOUNTER — TELEPHONE (OUTPATIENT)
Dept: FAMILY MEDICINE | Facility: CLINIC | Age: 60
End: 2020-12-02

## 2020-12-02 DIAGNOSIS — J45.40 MODERATE PERSISTENT ASTHMA WITHOUT COMPLICATION: Primary | ICD-10-CM

## 2020-12-02 RX ORDER — BUDESONIDE AND FORMOTEROL FUMARATE DIHYDRATE 80; 4.5 UG/1; UG/1
2 AEROSOL RESPIRATORY (INHALATION) 2 TIMES DAILY
Qty: 1 INHALER | Refills: 2 | Status: SHIPPED | OUTPATIENT
Start: 2020-12-02 | End: 2020-12-02

## 2020-12-02 RX ORDER — FLUTICASONE PROPIONATE AND SALMETEROL XINAFOATE 115; 21 UG/1; UG/1
2 AEROSOL, METERED RESPIRATORY (INHALATION) 2 TIMES DAILY
Qty: 12 G | Refills: 5 | Status: SHIPPED | OUTPATIENT
Start: 2020-12-02 | End: 2021-06-29

## 2020-12-02 NOTE — TELEPHONE ENCOUNTER
Message: budesonide-formoterol (SYMBICORT) 80-4.5 MCG/ACT Inhaler not covered by patient plan.    Pref alt: Symbicort, Dulera, Deandra.

## 2021-01-15 ENCOUNTER — HEALTH MAINTENANCE LETTER (OUTPATIENT)
Age: 61
End: 2021-01-15

## 2021-03-25 DIAGNOSIS — E78.5 HYPERLIPIDEMIA, UNSPECIFIED HYPERLIPIDEMIA TYPE: ICD-10-CM

## 2021-03-25 RX ORDER — ATORVASTATIN CALCIUM 40 MG/1
40 TABLET, FILM COATED ORAL DAILY
Qty: 90 TABLET | Refills: 1 | Status: SHIPPED | OUTPATIENT
Start: 2021-03-25 | End: 2021-06-29

## 2021-05-30 ENCOUNTER — HEALTH MAINTENANCE LETTER (OUTPATIENT)
Age: 61
End: 2021-05-30

## 2021-06-29 ENCOUNTER — TELEPHONE (OUTPATIENT)
Dept: FAMILY MEDICINE | Facility: CLINIC | Age: 61
End: 2021-06-29

## 2021-06-29 ENCOUNTER — VIRTUAL VISIT (OUTPATIENT)
Dept: FAMILY MEDICINE | Facility: CLINIC | Age: 61
End: 2021-06-29
Payer: COMMERCIAL

## 2021-06-29 ENCOUNTER — MYC MEDICAL ADVICE (OUTPATIENT)
Dept: FAMILY MEDICINE | Facility: CLINIC | Age: 61
End: 2021-06-29

## 2021-06-29 DIAGNOSIS — E78.5 DYSLIPIDEMIA: ICD-10-CM

## 2021-06-29 DIAGNOSIS — J45.40 MODERATE PERSISTENT ASTHMA WITHOUT COMPLICATION: ICD-10-CM

## 2021-06-29 DIAGNOSIS — J45.41 MODERATE PERSISTENT ASTHMA WITH EXACERBATION: Primary | ICD-10-CM

## 2021-06-29 DIAGNOSIS — E11.9 TYPE 2 DIABETES MELLITUS WITHOUT COMPLICATION, WITHOUT LONG-TERM CURRENT USE OF INSULIN (H): Primary | ICD-10-CM

## 2021-06-29 PROBLEM — K76.0 NONALCOHOLIC FATTY LIVER DISEASE: Status: ACTIVE | Noted: 2021-06-29

## 2021-06-29 PROBLEM — J30.9 ALLERGIC RHINITIS: Status: ACTIVE | Noted: 2021-06-29

## 2021-06-29 PROBLEM — N95.1 MENOPAUSAL FLUSHING: Status: ACTIVE | Noted: 2018-05-18

## 2021-06-29 PROBLEM — I83.90 VARICOSE VEINS OF LOWER EXTREMITY: Status: ACTIVE | Noted: 2018-05-18

## 2021-06-29 PROCEDURE — 99214 OFFICE O/P EST MOD 30 MIN: CPT | Mod: 95 | Performed by: NURSE PRACTITIONER

## 2021-06-29 RX ORDER — ATORVASTATIN CALCIUM 40 MG/1
40 TABLET, FILM COATED ORAL DAILY
Qty: 90 TABLET | Refills: 0 | Status: SHIPPED | OUTPATIENT
Start: 2021-06-29 | End: 2021-09-07

## 2021-06-29 RX ORDER — FLUTICASONE PROPIONATE AND SALMETEROL XINAFOATE 115; 21 UG/1; UG/1
2 AEROSOL, METERED RESPIRATORY (INHALATION) 2 TIMES DAILY
Qty: 12 G | Refills: 2 | Status: SHIPPED | OUTPATIENT
Start: 2021-06-29 | End: 2021-07-01

## 2021-06-29 RX ORDER — ALBUTEROL SULFATE 90 UG/1
1-2 AEROSOL, METERED RESPIRATORY (INHALATION) EVERY 4 HOURS PRN
Qty: 8.5 G | Refills: 2 | Status: SHIPPED | OUTPATIENT
Start: 2021-06-29 | End: 2021-09-07

## 2021-06-29 NOTE — TELEPHONE ENCOUNTER
Pt completed Virtual Visit with Kathie Tabares CNP, today.    1. Pt was expecting an order for prednisone, no order found.  2. Please send alternative for Advair if there is an appropriate alternative (see [employer] message).    BRIDGETTE EdmondN, RN

## 2021-06-29 NOTE — PROGRESS NOTES
Yeyo is a 60 year old who is being evaluated via a billable video visit.      How would you like to obtain your AVS? MyChart  If the video visit is dropped, the invitation should be resent by: Text to cell phone: .  Will anyone else be joining your video visit? No    Video Start Time: 1100    Assessment & Plan     Type 2 diabetes mellitus without complication, without long-term current use of insulin (H)  Patient needs refills of Metformin- sent 90 day supply but advised I would like her to come in for a lab appointment as she is over due for labs.  She reported understanding.  Further refills once labs can be reviewed.     - Albumin Random Urine Quantitative with Creat Ratio; Future  - OPTOMETRY REFERRAL; Future  - Hemoglobin A1c; Future  - Basic metabolic panel; Future  - metFORMIN (GLUCOPHAGE) 500 MG tablet; Take 1 tablet (500 mg) by mouth daily (with breakfast)    Dyslipidemia  Patient needs refills of atorvastatin- sent 90 day supply but advised I would like her to come in for a lab appointment as she is over due for labs.  She reported understanding.  Further refills once labs can be reviewed.     - Lipid panel reflex to direct LDL fasting; Future  - atorvastatin (LIPITOR) 40 MG tablet; Take 1 tablet (40 mg) by mouth daily    Moderate persistent asthma without complication  Chronic, stable.  Continue Advair and prn albuterol.     - fluticasone-salmeterol (ADVAIR-HFA) 115-21 MCG/ACT inhaler; Inhale 2 puffs into the lungs 2 times daily  - albuterol (PROAIR HFA/PROVENTIL HFA/VENTOLIN HFA) 108 (90 Base) MCG/ACT inhaler; Inhale 1-2 puffs into the lungs every 4 hours as needed for shortness of breath / dyspnea or wheezing      Return in about 4 weeks (around 7/27/2021) for Lab Work.    Kathie Tabares, [facility]   Yeyo is a 60 year old who presents for the following health issues     HPI     Started as video visit, but  unable to do video, then switched to  telephone visit.   Lithuanian  on phone as well as patient's daughter.     Needs refill on asthma medication  Has been stable.   Taking Advair and has albuterol for as needed.    Will have ACT mailed to her to complete.     Diabetes, taking metformin 500 mg daily.  Denies side effects.   Last A1C 9/30/21 was 6.4.  Due for labs now.  Plans to come in next month for lab only visit.    No numbness or tingling in feet. No redness, sores or blisters on feet.  No complaints of excessive thirst.  No reports of blurry vision.  No significant changes to weight.     Dyslipidemia.  Tolerating atorvastatin 40 mg without side effects.      Review of Systems   Constitutional, HEENT, cardiovascular, pulmonary, gi and gu systems are negative, except as otherwise noted.      Objective           Vitals:  No vitals were obtained today due to virtual visit.    Physical Exam   GENERAL: Alert and no distress   RESP: No cough or audible wheeze, speaks in full sentences.    NEURO:   Mentation and speech appropriate for age.  PSYCH: Mentation appears normal, affect normal/bright, judgement and insight intact, normal speech     Lab Results   Component Value Date    A1C 6.4 09/30/2020    A1C 6.3 02/03/2020    A1C 5.9 01/23/2020     Recent Labs   Lab Test 09/30/20  0750 01/23/20  1249    140   POTASSIUM 3.7 4.4   CHLORIDE 106 108   CO2 27 29   ANIONGAP 6 3   * 88   BUN 18 15   CR 0.68 0.58   MOON 8.9 8.9     Recent Labs   Lab Test 09/30/20  0750 01/23/20  1249   CHOL 200* 209*   HDL 61 51   * 114*   TRIG 120 221*           Video-Visit Details    Type of service:  Video Visit    Video End Time:1130    Originating Location (pt. Location): Home    Distant Location (provider location):  [facility] [place]     Platform used for Video Visit: [employer]

## 2021-06-29 NOTE — LETTER
June 29, 2021      Yeyo Bradley  [address] [place] [address]      Dear Yeyo,     Your clinic record indicates that you are due for an asthma update. We have a survey tool called an ACT (or Asthma Control Test) we use to measure the level of control of your asthma. Please complete the enclosed questionnaire and mail it back to us in the self-addressed stamped envelope.     If you have questions about this letter please contact your provider.     Sincerely,       Your [facility] Team

## 2021-06-30 RX ORDER — PREDNISONE 20 MG/1
40 TABLET ORAL DAILY
Qty: 10 TABLET | Refills: 0 | Status: SHIPPED | OUTPATIENT
Start: 2021-06-30 | End: 2021-07-05

## 2021-06-30 NOTE — TELEPHONE ENCOUNTER
I sent a prescription for prednisone 40 mg daily x 5 days.   For her Advair- family should check with pharmacy on a similar product that would be covered by her insurance.   Every similar product I enter for her says it is not on the formulary or a prior authorization is needed.  Does the pharmacy happen to have the Advair diskus in stock instead of the HFA?  That would be a good option.  Otherwise can they transfer it to a pharmacy that has it in stock?  Kathie Tabares, CNP

## 2021-08-20 ENCOUNTER — MYC MEDICAL ADVICE (OUTPATIENT)
Dept: FAMILY MEDICINE | Facility: CLINIC | Age: 61
End: 2021-08-20

## 2021-08-20 ENCOUNTER — TELEPHONE (OUTPATIENT)
Dept: FAMILY MEDICINE | Facility: CLINIC | Age: 61
End: 2021-08-20

## 2021-08-20 NOTE — TELEPHONE ENCOUNTER
Patient Quality Outreach Summary      Summary:    Patient is due/failing the following:   FIT, Colonoscopy or Cologuard    Type of outreach:    Sent [employer] message.    Questions for provider review:    None                                                                                                                    Rosa Ellington CMA       Chart routed to close.

## 2021-09-07 ENCOUNTER — OFFICE VISIT (OUTPATIENT)
Dept: FAMILY MEDICINE | Facility: CLINIC | Age: 61
End: 2021-09-07
Payer: COMMERCIAL

## 2021-09-07 VITALS
OXYGEN SATURATION: 95 % | SYSTOLIC BLOOD PRESSURE: 118 MMHG | DIASTOLIC BLOOD PRESSURE: 75 MMHG | TEMPERATURE: 97.7 F | HEIGHT: 61 IN | BODY MASS INDEX: 29.83 KG/M2 | HEART RATE: 77 BPM | WEIGHT: 158 LBS

## 2021-09-07 DIAGNOSIS — E11.9 TYPE 2 DIABETES MELLITUS WITHOUT COMPLICATION, WITHOUT LONG-TERM CURRENT USE OF INSULIN (H): Primary | ICD-10-CM

## 2021-09-07 DIAGNOSIS — J45.41 MODERATE PERSISTENT ASTHMA WITH EXACERBATION: ICD-10-CM

## 2021-09-07 DIAGNOSIS — E78.5 DYSLIPIDEMIA: ICD-10-CM

## 2021-09-07 DIAGNOSIS — J45.40 MODERATE PERSISTENT ASTHMA WITHOUT COMPLICATION: ICD-10-CM

## 2021-09-07 DIAGNOSIS — E11.9 TYPE 2 DIABETES MELLITUS WITHOUT COMPLICATION, WITHOUT LONG-TERM CURRENT USE OF INSULIN (H): ICD-10-CM

## 2021-09-07 DIAGNOSIS — Z12.31 ENCOUNTER FOR SCREENING MAMMOGRAM FOR BREAST CANCER: ICD-10-CM

## 2021-09-07 DIAGNOSIS — Z12.11 COLON CANCER SCREENING: ICD-10-CM

## 2021-09-07 LAB
ANION GAP SERPL CALCULATED.3IONS-SCNC: 4 MMOL/L (ref 3–14)
BUN SERPL-MCNC: 12 MG/DL (ref 7–30)
CALCIUM SERPL-MCNC: 9.2 MG/DL (ref 8.5–10.1)
CHLORIDE BLD-SCNC: 108 MMOL/L (ref 94–109)
CHOLEST SERPL-MCNC: 149 MG/DL
CO2 SERPL-SCNC: 27 MMOL/L (ref 20–32)
CREAT SERPL-MCNC: 0.65 MG/DL (ref 0.52–1.04)
CREAT UR-MCNC: 84 MG/DL
FASTING STATUS PATIENT QL REPORTED: YES
GFR SERPL CREATININE-BSD FRML MDRD: >90 ML/MIN/1.73M2
GLUCOSE BLD-MCNC: 127 MG/DL (ref 70–99)
HBA1C MFR BLD: 7.4 % (ref 0–5.6)
HDLC SERPL-MCNC: 52 MG/DL
LDLC SERPL CALC-MCNC: 73 MG/DL
MICROALBUMIN UR-MCNC: 5 MG/L
MICROALBUMIN/CREAT UR: 5.95 MG/G CR (ref 0–25)
NONHDLC SERPL-MCNC: 97 MG/DL
POTASSIUM BLD-SCNC: 4 MMOL/L (ref 3.4–5.3)
SODIUM SERPL-SCNC: 139 MMOL/L (ref 133–144)
TRIGL SERPL-MCNC: 122 MG/DL

## 2021-09-07 PROCEDURE — 36415 COLL VENOUS BLD VENIPUNCTURE: CPT | Performed by: FAMILY MEDICINE

## 2021-09-07 PROCEDURE — 82043 UR ALBUMIN QUANTITATIVE: CPT | Performed by: FAMILY MEDICINE

## 2021-09-07 PROCEDURE — 80048 BASIC METABOLIC PNL TOTAL CA: CPT | Performed by: FAMILY MEDICINE

## 2021-09-07 PROCEDURE — 80061 LIPID PANEL: CPT | Performed by: FAMILY MEDICINE

## 2021-09-07 PROCEDURE — 83036 HEMOGLOBIN GLYCOSYLATED A1C: CPT | Performed by: FAMILY MEDICINE

## 2021-09-07 PROCEDURE — 99214 OFFICE O/P EST MOD 30 MIN: CPT | Performed by: FAMILY MEDICINE

## 2021-09-07 RX ORDER — ALBUTEROL SULFATE 90 UG/1
1-2 AEROSOL, METERED RESPIRATORY (INHALATION) EVERY 4 HOURS PRN
Qty: 8.5 G | Refills: 2 | Status: SHIPPED | OUTPATIENT
Start: 2021-09-07

## 2021-09-07 RX ORDER — ATORVASTATIN CALCIUM 40 MG/1
40 TABLET, FILM COATED ORAL DAILY
Qty: 90 TABLET | Refills: 1 | Status: SHIPPED | OUTPATIENT
Start: 2021-09-07

## 2021-09-07 ASSESSMENT — MIFFLIN-ST. JEOR: SCORE: 1210.09

## 2021-09-07 NOTE — PROGRESS NOTES
Left a message with an  on her home number to call and schedule a mammogram and colonoscopy.  Mari GREY - Shushan

## 2021-09-07 NOTE — LETTER
My Asthma Action Plan    Name: Yeyo Bradley   YOB: 1960  Date: 9/7/2021   My doctor: Garrett Reyna MD   My clinic: [facility]        My Control Medicine: Fluticasone propionate + salmeterol (Advair HFA) -  230/21 mcg 1  My Rescue Medicine: Albuterol (Proair/Ventolin/Proventil HFA) 2-4 puffs EVERY 4 HOURS as needed. Use a spacer if recommended by your provider.  My Oral Steroid Medicine: prednisone My Asthma Severity:   Mild Persistent  Know your asthma triggers: upper respiratory infections  None            GREEN ZONE   Good Control    I feel good    No cough or wheeze    Can work, sleep and play without asthma symptoms       Take your asthma control medicine every day.     1. If exercise triggers your asthma, take your rescue medication    15 minutes before exercise or sports, and    During exercise if you have asthma symptoms  2. Spacer to use with inhaler: If you have a spacer, make sure to use it with your inhaler             YELLOW ZONE Getting Worse  I have ANY of these:    I do not feel good    Cough or wheeze    Chest feels tight    Wake up at night   1. Keep taking your Green Zone medications  2. Start taking your rescue medicine:    every 20 minutes for up to 1 hour. Then every 4 hours for 24-48 hours.  3. If you stay in the Yellow Zone for more than 12-24 hours, contact your doctor.  4. If you do not return to the Green Zone in 12-24 hours or you get worse, start taking your oral steroid medicine if prescribed by your provider.           RED ZONE Medical Alert - Get Help  I have ANY of these:    I feel awful    Medicine is not helping    Breathing getting harder    Trouble walking or talking    Nose opens wide to breathe       1. Take your rescue medicine NOW  2. If your provider has prescribed an oral steroid medicine, start taking it NOW  3. Call your doctor NOW  4. If you are still in the Red Zone after 20 minutes and you have not reached your  doctor:    Take your rescue medicine again and    Call 911 or go to the emergency room right away    See your regular doctor within 2 weeks of an Emergency Room or Urgent Care visit for follow-up treatment.          Annual Reminders:  Meet with Asthma Educator,  Flu Shot in the Fall, consider Pneumonia Vaccination for patients with asthma (aged 19 and older).    Pharmacy:    [id number] - [place], [place] - [id number] [facility] [address] [id number] - [place], [place] - [address] [facility] [id number] - [place], [place] - [address]    Electronically signed by Garrett Reyna MD   Date: 09/07/21                      Asthma Triggers  How To Control Things That Make Your Asthma Worse    Triggers are things that make your asthma worse.  Look at the list below to help you find your triggers and what you can do about them.  You can help prevent asthma flare-ups by staying away from your triggers.      Trigger                                                          What you can do   Cigarette Smoke  Tobacco smoke can make asthma worse. Do not allow smoking in your home, car or around you.  Be sure no one smokes at a child s day care or school.  If you smoke, ask your health care provider for ways to help you quit.  Ask family members to quit too.  Ask your health care provider for a referral to Quit Plan to help you quit smoking, or call 4-226-759-PLAN.     Colds, Flu, Bronchitis  These are common triggers of asthma. Wash your hands often.  Don t touch your eyes, nose or mouth.  Get a flu shot every year.     Dust Mites  These are tiny bugs that live in cloth or carpet. They are too small to see. Wash sheets and blankets in hot water every week.   Encase pillows and mattress in dust mite proof covers.  Avoid having carpet if you can. If you have carpet, vacuum weekly.    Use a dust mask and HEPA vacuum.   Pollen and Outdoor Mold  Some people are allergic to trees, grass, or weed pollen, or molds. Try to keep your windows closed.  Limit time out doors when pollen count is high.   Ask you health care provider about taking medicine during allergy season.     Animal Dander  Some people are allergic to skin flakes, urine or saliva from pets with fur or feathers. Keep pets with fur or feathers out of your home.    If you can t keep the pet outdoors, then keep the pet out of your bedroom.  Keep the bedroom door closed.  Keep pets off cloth furniture and away from stuffed toys.     Mice, Rats, and Cockroaches   Some people are allergic to the waste from these pests.   Cover food and garbage.  Clean up spills and food crumbs.  Store grease in the refrigerator.   Keep food out of the bedroom.   Indoor Mold  This can be a trigger if your home has high moisture. Fix leaking faucets, pipes, or other sources of water.   Clean moldy surfaces.  Dehumidify basement if it is damp and smelly.   Smoke, Strong Odors, and Sprays  These can reduce air quality. Stay away from strong odors and sprays, such as perfume, powder, hair spray, paints, smoke incense, paint, cleaning products, candles and new carpet.   Exercise or Sports  Some people with asthma have this trigger. Be active!  Ask your doctor about taking medicine before sports or exercise to prevent symptoms.    Warm up for 5-10 minutes before and after sports or exercise.     Other Triggers of Asthma  Cold air:  Cover your nose and mouth with a scarf.  Sometimes laughing or crying can be a trigger.  Some medicines and food can trigger asthma.

## 2021-09-07 NOTE — LETTER
September 13, 2021      Yeyo Bradley  [address] [place] [address]        Dear ,    We are writing to inform you of your test results.    Your A1C is worse than before.  Please increase Metformin to 500 mg TWICE daily with meals.  I sent a new prescription to your pharmacy. I would like you to follow-up in 3 months with an office visit.    Resulted Orders   Lipid panel reflex to direct LDL Fasting   Result Value Ref Range    Cholesterol 149 <200 mg/dL      Comment:      Age 0-19 years  Desirable: <170 mg/dL  Borderline high:  170-199 mg/dl  High:            >199 mg/dl    Age 20 years and older  Desirable: <200 mg/dL    Triglycerides 122 <150 mg/dL      Comment:      0-9 years:  Normal:    Less than 75 mg/dL  Borderline high:  75-99 mg/dL  High:             Greater than or equal to 100 mg/dL    0-19 years:  Normal:    Less than 90 mg/dL  Borderline high:   mg/dL  High:             Greater than or equal to 130 mg/dL    20 years and older:  Normal:    Less than 150 mg/dL  Borderline high:  150-199 mg/dL  High:             200-499 mg/dL  Very high:   Greater than or equal to 500 mg/dL    Direct Measure HDL 52 >=50 mg/dL      Comment:      0-19 years:       Greater than or equal to 45 mg/dL   Low: Less than 40 mg/dL   Borderline low: 40-44 mg/dL     20 years and older:   Female: Greater than or equal to 50 mg/dL   Male:   Greater than or equal to 40 mg/dL         LDL Cholesterol Calculated 73 <=100 mg/dL      Comment:      Age 0-19 years:  Desirable: 0-110 mg/dL   Borderline high: 110-129 mg/dL   High: >= 130 mg/dL    Age 20 years and older:  Desirable: <100mg/dL  Above desirable: 100-129 mg/dL   Borderline high: 130-159 mg/dL   High: 160-189 mg/dL   Very high: >= 190 mg/dL    Non HDL Cholesterol 97 <130 mg/dL      Comment:      0-19 years:  Desirable:          Less than 120 mg/dL  Borderline high:   120-144 mg/dL  High:                   Greater than or equal to 145 mg/dL    20 years and  older:  Desirable:          130 mg/dL  Above Desirable: 130-159 mg/dL  Borderline high:   160-189 mg/dL  High:               190-219 mg/dL  Very high:     Greater than or equal to 220 mg/dL    Patient Fasting > 8hrs? Yes    Basic metabolic panel   Result Value Ref Range    Sodium 139 133 - 144 mmol/L    Potassium 4.0 3.4 - 5.3 mmol/L    Chloride 108 94 - 109 mmol/L    Carbon Dioxide (CO2) 27 20 - 32 mmol/L    Anion Gap 4 3 - 14 mmol/L    Urea Nitrogen 12 7 - 30 mg/dL    Creatinine 0.65 0.52 - 1.04 mg/dL    Calcium 9.2 8.5 - 10.1 mg/dL    Glucose 127 (H) 70 - 99 mg/dL    GFR Estimate >90 >60 mL/min/1.73m2      Comment:      As of July 11, 2021, eGFR is calculated by the CKD-EPI creatinine equation, without race adjustment. eGFR can be influenced by muscle mass, exercise, and diet. The reported eGFR is an estimation only and is only applicable if the renal function is stable.   Hemoglobin A1c   Result Value Ref Range    Hemoglobin A1C 7.4 (H) 0.0 - 5.6 %      Comment:      Normal <5.7%   Prediabetes 5.7-6.4%    Diabetes 6.5% or higher     Note: Adopted from ADA consensus guidelines.       If you have any questions or concerns, please call the clinic at the number listed above.       Sincerely,      Kathie Tabares, CNP/kp

## 2021-09-07 NOTE — PROGRESS NOTES
"SUBJECTIVE:  Yeyo Bradley, a 61 year old female scheduled an appointment to discuss the following issues:  Patient new to me. Follow-up asthma, high cholesterol and dm.  Fasting today.   Breathing overall ok. prednisone given 2 months ago.  No chest pain. Exercise some. No urine changes or hematuria.   Lives with children 5.    Emotionally doing ok. Watching sugar in diet.   No nausea, vomiting or diarrhea.   Due for mammogram. No breast changes noted.   Colonoscopy many years ago?   Medical, social, surgical, and family histories reviewed.    ROS:  All other ROS negative.     OBJECTIVE:  /75   Pulse 77   Temp 97.7  F (36.5  C) (Tympanic)   Ht 1.543 m (5' 0.75\")   Wt 71.7 kg (158 lb)   SpO2 95%   BMI 30.10 kg/m   EXAM:  GENERAL APPEARANCE: healthy, alert and no distress  EYES: EOMI,  PERRL  NECK: no adenopathy, no asymmetry, masses, or scars and thyroid normal to palpation  RESP: lungs clear to auscultation - no rales, rhonchi or wheezes  CV: regular rates and rhythm, normal S1 S2, no S3 or S4 and no murmur, click or rub -  ABDOMEN:  soft, nontender, no HSM or masses and bowel sounds normal  MS: extremities normal- no gross deformities noted, no evidence of inflammation in joints, FROM in all extremities.  NEURO: Normal strength and tone, sensory exam grossly normal, mentation intact and speech normal  PSYCH: mentation appears normal and affect normal/bright    ASSESSMENT / PLAN:  (E11.9) Type 2 diabetes mellitus without complication, without long-term current use of insulin (H)  (primary encounter diagnosis)  Comment: stable  Plan: ACE/ARB/ARNI NOT PRESCRIBED (INTENTIONAL),         metFORMIN (GLUCOPHAGE) 500 MG tablet, blood         glucose (NO BRAND SPECIFIED) test strip        Diet/exercise and 5 lbs weight loss. Recheck in 6 months  Follow-up eye exam.   Consider lisinopril if worsening blood pressure/urine microalb.     (J45.41) Moderate persistent asthma with exacerbation  Comment: " stable  Plan: continue MDI's.     (Z12.11) Colon cancer screening    Plan: Adult Gastro Ref - Procedure Only        [place] if not interested.     (Z12.31) Encounter for screening mammogram for breast cancer  Plan: *MA Screening Digital Bilateral            (E78.5) Dyslipidemia  Comment: stable?  Plan: continue statin.     Garrett Reyna MD

## 2021-09-07 NOTE — TELEPHONE ENCOUNTER
Patient was seen today in clinic. She states she did not receive refills of the following prescriptions and is needing them please.  albuterol (PROAIR HFA/PROVENTIL HFA/VENTOLIN HFA) 108 (90 Base) MCG/ACT inhaler,  atorvastatin (LIPITOR) 40 MG tablet,  fluticasone-salmeterol (ADVAIR) 250-50 MCG/DOSE inhaler  And she states the pharmacy has not received the one sent foe the test strips. Please verify.    Thank you,  Anjum Avila             
There is not a current, normal ACT on file in the last 6 months.  RN unable to refill medication.    Nirmala Stack BSN, RN    
04-Dec-2020 02:35

## 2021-09-08 ASSESSMENT — ASTHMA QUESTIONNAIRES: ACT_TOTALSCORE: 16

## 2021-09-15 ENCOUNTER — APPOINTMENT (OUTPATIENT)
Dept: INTERPRETER SERVICES | Facility: CLINIC | Age: 61
End: 2021-09-15
Payer: COMMERCIAL

## 2021-09-19 ENCOUNTER — HEALTH MAINTENANCE LETTER (OUTPATIENT)
Age: 61
End: 2021-09-19

## 2021-09-24 ENCOUNTER — DOCUMENTATION ONLY (OUTPATIENT)
Dept: LAB | Facility: CLINIC | Age: 61
End: 2021-09-24

## 2021-09-24 NOTE — PROGRESS NOTES
Yeyo Bradley has an upcoming lab appointment:    Future Appointments   Date Time Provider Department [place]   9/27/2021  9:45 AM AN LAB [facility]   11/4/2021 11:30 AM ANDMA1 [facility]     Patient is scheduled for the following lab(s): fasting labs    Patient either has no future order, or has Health Maintenance labs due. Please review and place either future orders or HMPO (Review of Health Maintenance Protocol Orders), as appropriate.    There are no preventive care reminders to display for this patient.  Leidy Kevin

## 2021-09-27 NOTE — TELEPHONE ENCOUNTER
Patient did not come to this appointment.  Mari GREY - [place]     
Patient just had labs on 9/7 and doesn't need any more labs for me.  I'm not sure what her lab appointment is for. Kathie Tabares, CNP  
Statement Selected

## 2021-11-04 ENCOUNTER — OFFICE VISIT (OUTPATIENT)
Dept: URGENT CARE | Facility: URGENT CARE | Age: 61
End: 2021-11-04
Payer: COMMERCIAL

## 2021-11-04 ENCOUNTER — NURSE TRIAGE (OUTPATIENT)
Dept: FAMILY MEDICINE | Facility: CLINIC | Age: 61
End: 2021-11-04

## 2021-11-04 VITALS
SYSTOLIC BLOOD PRESSURE: 122 MMHG | BODY MASS INDEX: 30.48 KG/M2 | WEIGHT: 160 LBS | HEART RATE: 73 BPM | DIASTOLIC BLOOD PRESSURE: 82 MMHG | TEMPERATURE: 96.9 F | OXYGEN SATURATION: 97 %

## 2021-11-04 DIAGNOSIS — R42 DIZZINESS: Primary | ICD-10-CM

## 2021-11-04 DIAGNOSIS — R42 VERTIGO: ICD-10-CM

## 2021-11-04 DIAGNOSIS — R51.9 MILD HEADACHE: ICD-10-CM

## 2021-11-04 LAB
ALBUMIN UR-MCNC: NEGATIVE MG/DL
APPEARANCE UR: CLEAR
BASOPHILS # BLD AUTO: 0.1 10E3/UL (ref 0–0.2)
BASOPHILS NFR BLD AUTO: 1 %
BILIRUB UR QL STRIP: NEGATIVE
COLOR UR AUTO: YELLOW
EOSINOPHIL # BLD AUTO: 0.4 10E3/UL (ref 0–0.7)
EOSINOPHIL NFR BLD AUTO: 4 %
ERYTHROCYTE [DISTWIDTH] IN BLOOD BY AUTOMATED COUNT: 12.8 % (ref 10–15)
GLUCOSE BLD-MCNC: 97 MG/DL (ref 60–99)
GLUCOSE UR STRIP-MCNC: NEGATIVE MG/DL
HCT VFR BLD AUTO: 42.6 % (ref 35–47)
HGB BLD-MCNC: 14 G/DL (ref 11.7–15.7)
HGB UR QL STRIP: NEGATIVE
KETONES UR STRIP-MCNC: NEGATIVE MG/DL
LEUKOCYTE ESTERASE UR QL STRIP: NEGATIVE
LYMPHOCYTES # BLD AUTO: 2.8 10E3/UL (ref 0.8–5.3)
LYMPHOCYTES NFR BLD AUTO: 30 %
MCH RBC QN AUTO: 29.4 PG (ref 26.5–33)
MCHC RBC AUTO-ENTMCNC: 32.9 G/DL (ref 31.5–36.5)
MCV RBC AUTO: 90 FL (ref 78–100)
MONOCYTES # BLD AUTO: 0.8 10E3/UL (ref 0–1.3)
MONOCYTES NFR BLD AUTO: 9 %
NEUTROPHILS # BLD AUTO: 5.3 10E3/UL (ref 1.6–8.3)
NEUTROPHILS NFR BLD AUTO: 56 %
NITRATE UR QL: NEGATIVE
PH UR STRIP: 5.5 [PH] (ref 5–7)
PLATELET # BLD AUTO: 205 10E3/UL (ref 150–450)
RBC # BLD AUTO: 4.76 10E6/UL (ref 3.8–5.2)
SP GR UR STRIP: 1.01 (ref 1–1.03)
UROBILINOGEN UR STRIP-ACNC: 0.2 E.U./DL
WBC # BLD AUTO: 9.3 10E3/UL (ref 4–11)

## 2021-11-04 PROCEDURE — 99214 OFFICE O/P EST MOD 30 MIN: CPT | Performed by: NURSE PRACTITIONER

## 2021-11-04 PROCEDURE — 36415 COLL VENOUS BLD VENIPUNCTURE: CPT | Performed by: NURSE PRACTITIONER

## 2021-11-04 PROCEDURE — 82947 ASSAY GLUCOSE BLOOD QUANT: CPT | Performed by: NURSE PRACTITIONER

## 2021-11-04 PROCEDURE — 81003 URINALYSIS AUTO W/O SCOPE: CPT | Performed by: NURSE PRACTITIONER

## 2021-11-04 PROCEDURE — 85025 COMPLETE CBC W/AUTO DIFF WBC: CPT | Performed by: NURSE PRACTITIONER

## 2021-11-04 RX ORDER — MECLIZINE HYDROCHLORIDE 25 MG/1
25 TABLET ORAL 3 TIMES DAILY PRN
Qty: 21 TABLET | Refills: 0 | Status: SHIPPED | OUTPATIENT
Start: 2021-11-04 | End: 2021-11-11

## 2021-11-04 ASSESSMENT — ENCOUNTER SYMPTOMS
HEADACHES: 1
DIZZINESS: 1

## 2021-11-04 NOTE — PROGRESS NOTES
SUBJECTIVE:   Yeyo Bradley is a 61 year old female presenting with a chief complaint of   Chief Complaint   Patient presents with     Dizziness     Lightheadness      Headache     Since this AM       She is an established patient of [facility].    Dizziness     Onset of symptoms was TODAY, Feels like the room is spinning  Course of illness is worsening  Severity moderate  Character of pain:aching   Current and associated symptoms: Mild headache l  Predisposing factors: Patient has type 2 diabetes she is also asthmatic, and takes blood pressure medications..       Review of Systems   Neurological: Positive for dizziness and headaches.   All other systems reviewed and are negative.      Past Medical History:   Diagnosis Date     Asthma      Diabetes (H)      Family History   Problem Relation Age of Onset     Glaucoma No family hx of      Macular Degeneration No family hx of      Current Outpatient Medications   Medication Sig Dispense Refill     ACE/ARB/ARNI NOT PRESCRIBED (INTENTIONAL) Please choose reason not prescribed from choices below.       albuterol (PROAIR HFA/PROVENTIL HFA/VENTOLIN HFA) 108 (90 Base) MCG/ACT inhaler Inhale 1-2 puffs into the lungs every 4 hours as needed for shortness of breath / dyspnea or wheezing 8.5 g 2     albuterol (PROVENTIL) (2.5 MG/3ML) 0.083% neb solution Take 1 vial (2.5 mg) by nebulization every 4 hours as needed for shortness of breath / dyspnea, wheezing or other (cough) 1 Box 2     atorvastatin (LIPITOR) 40 MG tablet Take 1 tablet (40 mg) by mouth daily 90 tablet 1     blood glucose (NO BRAND SPECIFIED) test strip Use to test blood sugar 1-2 times daily or as directed. 100 strip 3     fluticasone-salmeterol (ADVAIR) 250-50 MCG/DOSE inhaler Inhale 1 puff into the lungs every 12 hours 60 each 5     meclizine (ANTIVERT) 25 MG tablet Take 1 tablet (25 mg) by mouth 3 times daily as needed for dizziness 21 tablet 0     metFORMIN (GLUCOPHAGE) 500 MG tablet Take 1 tablet (500 mg) by  mouth 2 times daily (with meals) 180 tablet 0     order for DME Glucometer, lancets and test strips. brand as covered by insurance. 1 each 11     order for DME Equipment being ordered: Nebulizer and mask and tubing supplies 1 Device 0     order for DME Equipment being ordered: Glucometer per insurance preference, lancets, test strips.  Patient to check sugars 1 times daily, 90 day supply for test strips and lancets, refill 3 times 1 Device 0     Social History     Tobacco Use     Smoking status: Never Smoker     Smokeless tobacco: Never Used   Substance Use Topics     Alcohol use: Never       OBJECTIVE  /82   Pulse 73   Temp 96.9  F (36.1  C) (Tympanic)   Wt 72.6 kg (160 lb)   SpO2 97%   BMI 30.48 kg/m      Physical Exam  Vitals and nursing note reviewed.   Constitutional:       General: She is not in acute distress.     Appearance: She is well-developed. She is not diaphoretic.   HENT:      Head: Normocephalic and atraumatic.      Right Ear: Tympanic membrane and external ear normal.      Left Ear: Tympanic membrane and external ear normal.   Eyes:      Pupils: Pupils are equal, round, and reactive to light.   Pulmonary:      Effort: Pulmonary effort is normal. No respiratory distress.      Breath sounds: Normal breath sounds.   Musculoskeletal:      Cervical back: Normal range of motion and neck supple.   Lymphadenopathy:      Cervical: No cervical adenopathy.   Skin:     General: Skin is warm and dry.   Neurological:      Mental Status: She is alert.      Cranial Nerves: No cranial nerve deficit.      Comments: NEURO:  equal  strength, neg Romberg, DTR II/IV bilaterally (UE and LE), finger to nose normal, CN intact, ambulates without difficulty.  no focal deficits noted.           Labs:  Results for orders placed or performed in visit on 11/04/21 (from the past 24 hour(s))   CBC with platelets and differential    Narrative    The following orders were created for panel order CBC with platelets and  differential.  Procedure                               Abnormality         Status                     ---------                               -----------         ------                     CBC with platelets and d...[[id number]]                      Final result                 Please view results for these tests on the individual orders.   UA Macro with Reflex to Micro and Culture - lab collect    Specimen: Urine, Midstream   Result Value Ref Range    Color Urine Yellow Colorless, Straw, Light Yellow, Yellow    Appearance Urine Clear Clear    Glucose Urine Negative Negative mg/dL    Bilirubin Urine Negative Negative    Ketones Urine Negative Negative mg/dL    Specific Gravity Urine 1.010 1.003 - 1.035    Blood Urine Negative Negative    pH Urine 5.5 5.0 - 7.0    Protein Albumin Urine Negative Negative mg/dL    Urobilinogen Urine 0.2 0.2, 1.0 E.U./dL    Nitrite Urine Negative Negative    Leukocyte Esterase Urine Negative Negative    Narrative    Microscopic not indicated   Glucose, whole blood   Result Value Ref Range    Glucose Whole Blood 97 60 - 99 mg/dL   CBC with platelets and differential   Result Value Ref Range    WBC Count 9.3 4.0 - 11.0 10e3/uL    RBC Count 4.76 3.80 - 5.20 10e6/uL    Hemoglobin 14.0 11.7 - 15.7 g/dL    Hematocrit 42.6 35.0 - 47.0 %    MCV 90 78 - 100 fL    MCH 29.4 26.5 - 33.0 pg    MCHC 32.9 31.5 - 36.5 g/dL    RDW 12.8 10.0 - 15.0 %    Platelet Count 205 150 - 450 10e3/uL    % Neutrophils 56 %    % Lymphocytes 30 %    % Monocytes 9 %    % Eosinophils 4 %    % Basophils 1 %    Absolute Neutrophils 5.3 1.6 - 8.3 10e3/uL    Absolute Lymphocytes 2.8 0.8 - 5.3 10e3/uL    Absolute Monocytes 0.8 0.0 - 1.3 10e3/uL    Absolute Eosinophils 0.4 0.0 - 0.7 10e3/uL    Absolute Basophils 0.1 0.0 - 0.2 10e3/uL     ASSESSMENT:      ICD-10-CM    1. Dizziness  R42 CBC with platelets and differential     UA Macro with Reflex to Micro and Culture - lab collect     Glucose, whole blood     meclizine  (ANTIVERT) 25 MG tablet   2. Mild headache  R51.9    3. Vertigo  R42 meclizine (ANTIVERT) 25 MG tablet      PLAN:  I discussed lab/xray results with the patient.  Plan of care as above  I recommend follow up with PCP in 3 days or sooner if symptoms are getting worse  Worrisome symptoms are discussed and instructions to go to the ER.  All questions are answered and patient verbalized understanding and agrees with this plan.  Elyse Chapman  [facility]  Family Nurse Practitoner      Patient Instructions     Patient Education     Possible Causes of Dizziness or Fainting    Dizziness and fainting can have many causes. Below are some examples of possible causes your healthcare provider will look to rule out.   Benign paroxysmal positional vertigo (BPPV)  BPPV results when calcium crystals inside the inner ear shift into the wrong position. BPPV causes episodes of vertigo, a spinning sensation. Episodes most often happen when the head is moved in a certain way. This is more common in people age 65 and older.    Infection or inflammation  The semicircular canals of the ear may become infected or inflamed. In this case, they can send the wrong balance signals. This can cause vertigo.   Meniere disease  Meniere disease happens when there is too much fluid in the semicircular canals. This can cause vertigo. It also can cause hearing problems and buzzing or ringing in the ears (called tinnitus). You may also have a feeling of pressure or fullness in the ear.   Syncope  Syncope is fainting that happens when the brain doesn t get enough oxygen-rich blood. It can be caused by low heart rate or low blood pressure. This is called vasovagal syncope. It can also be caused by sitting or standing up too quickly. This is called orthostatic hypotension. Syncope may also be due to a heart valve problem, an abnormal heart rhythm, or other heart problems. Dizziness can also happen from stroke, hemorrhage in the brain, or other problems in the  brain. Your healthcare provider may do certain tests to rule out these conditions.   Other causes  Other causes include:    Medicines. Certain medicines can cause dizziness and even fainting. In some cases, stopping a medicine too quickly can lead to withdrawal symptoms, including dizziness and fainting.    Anxiety. Being anxious can lead to breathing changes, such as hyperventilation. These can lead to dizziness and fainting.  Additional causes for dizziness and fainting also exist. Talk with your healthcare provider for more information.      [employer] last reviewed this educational content on 5/1/2018 2000-2021 [employer]. All rights reserved. This information is not intended as a substitute for professional medical care. Always follow your healthcare professional's instructions.

## 2021-11-04 NOTE — TELEPHONE ENCOUNTER
Pt needs to go to URGENT CARE as this qualifies as htn urgency.  Pt not on a bp med so unable to adjust with follow-up tomorrow.

## 2021-11-04 NOTE — PATIENT INSTRUCTIONS
Patient Education     Possible Causes of Dizziness or Fainting    Dizziness and fainting can have many causes. Below are some examples of possible causes your healthcare provider will look to rule out.   Benign paroxysmal positional vertigo (BPPV)  BPPV results when calcium crystals inside the inner ear shift into the wrong position. BPPV causes episodes of vertigo, a spinning sensation. Episodes most often happen when the head is moved in a certain way. This is more common in people age 65 and older.    Infection or inflammation  The semicircular canals of the ear may become infected or inflamed. In this case, they can send the wrong balance signals. This can cause vertigo.   Meniere disease  Meniere disease happens when there is too much fluid in the semicircular canals. This can cause vertigo. It also can cause hearing problems and buzzing or ringing in the ears (called tinnitus). You may also have a feeling of pressure or fullness in the ear.   Syncope  Syncope is fainting that happens when the brain doesn t get enough oxygen-rich blood. It can be caused by low heart rate or low blood pressure. This is called vasovagal syncope. It can also be caused by sitting or standing up too quickly. This is called orthostatic hypotension. Syncope may also be due to a heart valve problem, an abnormal heart rhythm, or other heart problems. Dizziness can also happen from stroke, hemorrhage in the brain, or other problems in the brain. Your healthcare provider may do certain tests to rule out these conditions.   Other causes  Other causes include:    Medicines. Certain medicines can cause dizziness and even fainting. In some cases, stopping a medicine too quickly can lead to withdrawal symptoms, including dizziness and fainting.    Anxiety. Being anxious can lead to breathing changes, such as hyperventilation. These can lead to dizziness and fainting.  Additional causes for dizziness and fainting also exist. Talk with your  healthcare provider for more information.      [employer] last reviewed this educational content on 5/1/2018 2000-2021 [employer]. All rights reserved. This information is not intended as a substitute for professional medical care. Always follow your healthcare professional's instructions.

## 2021-11-04 NOTE — TELEPHONE ENCOUNTER
RN attempted to reach pt with assistance of [place] , Sol. RN relayed provider message below indicating that pt needs to be seen in urgent care now. Pt requests that the message be relayed to her daughter-in-law too. She states her daughter-in-law will need to drive her to urgent care. Pt provided her daughter-in-law, Neda, the phone. RN relayed provider message below to Neda. Neda states she will bring pt to urgent care within an hour. [facility] [place] and [place] locations and hours and advised pt should be seen in urgent care now.    Leesa Clifford MD routed conversation to An [facility] 9 minutes ago (3:20 PM)     Leesa Clifford MD 9 minutes ago (3:20 PM)        Pt needs to go to URGENT CARE as this qualifies as htn urgency.  Pt not on a bp med so unable to adjust with follow-up tomorrow.        JOSE ENRIQUE Edmond, RN

## 2021-11-04 NOTE — TELEPHONE ENCOUNTER
High priority call to RN, daughter-in-law, pt gives permission to talk to her as not CTC on file, need to confirm with pcp team f/u plan, daughter-in-law informs to call home number (pt cell) [phone] (home)  Via interpretor for f/u plan    Routed to PCP and triage, please follow up with pt via interpretor    Nurse Triage SBAR    Is this a 2nd Level Triage? YES, LICENSED PRACTITIONER REVIEW IS REQUIRED    Situation & Background: pt c/o dizziness and unsteadiness since 8 am this morning, see triage note, informs home /93 P76, taking per home automatic BP machine, better when not moving, alert and coherent, denies N-V-D, denies chest pain or headache, see note    Assessment: dizziness/lightedness    (See information below for more triage details.)    Recommendation: ok to go to urgent care?    Routing to provider for recommendation on 11/4/21 care team, PCP not in clinic, also routed to triage.    Protocol Recommended Disposition: Urgent care center        Reason for Disposition    Lightheadedness (dizziness) present now, after 2 hours of rest and fluids    Additional Information    Negative: Shock suspected (e.g., cold/pale/clammy skin, too weak to stand, low BP, rapid pulse)    Negative: Difficult to awaken or acting confused (e.g., disoriented, slurred speech)    Negative: Fainted, and still feels dizzy afterwards    Negative: Severe difficulty breathing (e.g., struggling for each breath, speaks in single words)    Negative: Overdose (accidental or intentional) of medications    Negative: New neurologic deficit that is present now: * Weakness of the face, arm, or leg on one side of the body * Numbness of the face, arm, or leg on one side of the body * Loss of speech or garbled speech    Negative: Heart beating < 50 beats per minute OR > 140 beats per minute    Negative: Sounds like a life-threatening emergency to the triager    Negative: Chest pain    Negative: Rectal bleeding, bloody stool, or  "tarry-black stool    Negative: Vomiting is the main symptom    Negative: Diarrhea is the main symptom    Negative: Headache is the main symptom    Negative: Heat exhaustion suspected (i.e., dehydration from heat exposure)    Negative: Patient states that he/she is having an anxiety/panic attack    Negative: SEVERE dizziness (e.g., unable to stand, requires support to walk, feels like passing out now)    Negative: SEVERE headache or neck pain    Negative: Spinning or tilting sensation (vertigo) present now and one or more stroke risk factors (i.e., hypertension, diabetes, prior stroke/TIA, heart attack, age over 60) (Exception: prior physician evaluation for this AND no different/worse than usual)    Negative: Loss of vision or double vision    Negative: Extra heart beats OR irregular heart beating (i.e., 'palpitations')    Negative: Difficulty breathing    Negative: Drinking very little and has signs of dehydration (e.g., no urine > 12 hours, very dry mouth, very lightheaded)    Negative: Follows bleeding (e.g., stomach, rectum, vagina) (Exception: became dizzy from sight of small amount blood)    Negative: Patient sounds very sick or weak to the triager    Answer Assessment - Initial Assessment Questions  1. DESCRIPTION: \"Describe your dizziness.\"      Probably combination of vertigo and off balance  2. LIGHTHEADED: \"Do you feel lightheaded?\" (e.g., somewhat faint, woozy, weak upon standing)      No   3. VERTIGO: \"Do you feel like either you or the room is spinning or tilting?\" (i.e. vertigo)      Yes   4. SEVERITY: \"How bad is it?\"  \"Do you feel like you are going to faint?\" \"Can you stand and walk?\"     - MODERATE - interferes with normal activities (e.g., work, school)   5. ONSET:  \"When did the dizziness begin?\"      This morning, 8 am  6. AGGRAVATING FACTORS: \"Does anything make it worse?\" (e.g., standing, change in head position)      Yes standing up or laying down  7. HEART RATE: \"Can you tell me your heart " "rate?\" \"How many beats in 15 seconds?\"  (Note: not all patients can do this)        Will recheck BP and this provides pulse  8. CAUSE: \"What do you think is causing the dizziness?\"      ? Blood sugar  9. RECURRENT SYMPTOM: \"Have you had dizziness before?\" If so, ask: \"When was the last time?\" \"What happened that time?\"      Maybe one year ago, can't recall   10. OTHER SYMPTOMS: \"Do you have any other symptoms?\" (e.g., fever, chest pain, vomiting, diarrhea, bleeding)        No  11. PREGNANCY: \"Is there any chance you are pregnant?\" \"When was your last menstrual period?\"        n/a    Protocols used: DIZZINESS-A-OH    Elizabeth Watson RN, BSN  Message handled by CLINIC NURSE.    "

## 2021-11-14 ENCOUNTER — HEALTH MAINTENANCE LETTER (OUTPATIENT)
Age: 61
End: 2021-11-14

## 2021-12-17 ENCOUNTER — TELEPHONE (OUTPATIENT)
Dept: FAMILY MEDICINE | Facility: CLINIC | Age: 61
End: 2021-12-17
Payer: COMMERCIAL

## 2021-12-17 ENCOUNTER — VIRTUAL VISIT (OUTPATIENT)
Dept: URGENT CARE | Facility: CLINIC | Age: 61
End: 2021-12-17
Payer: COMMERCIAL

## 2021-12-17 DIAGNOSIS — Z20.822 PERSON UNDER INVESTIGATION FOR COVID-19: Primary | ICD-10-CM

## 2021-12-17 DIAGNOSIS — J02.9 SORE THROAT: ICD-10-CM

## 2021-12-17 PROCEDURE — 99213 OFFICE O/P EST LOW 20 MIN: CPT | Mod: 95

## 2021-12-17 NOTE — TELEPHONE ENCOUNTER
Please let Yeyo know that I am sorry but do not have any openings for today.      ANTONI Marin CNP

## 2021-12-17 NOTE — PROGRESS NOTES
" Services Utilized     The patient has been notified of following:     \"This telephone visit will be conducted via a call between you and your physician/provider. We have found that certain health care needs can be provided without the need for a physical exam.  This service lets us provide the care you need with a short phone conversation.  If a prescription is necessary we can send it directly to your pharmacy.  If lab work is needed we can place an order for that and you can then stop by our lab to have the test done at a later time.    Telephone visits are billed at different rates depending on your insurance coverage. During this emergency period, for some insurers they may be billed the same as an in-person visit.  Please reach out to your insurance provider with any questions.    If during the course of the call the physician/provider feels a telephone visit is not appropriate, you will not be charged for this service.\"      Assessment & Plan:       See patient instructions below.  At the end of the encounter, I discussed diagnosis & medications. Discussed red flags for being seen in person at clinic/ER, as well as indications for follow up if no improvement. Patient understood and agreed to plan.       ICD-10-CM    1. Person under investigation for COVID-19  Z20.822 Symptomatic; Unknown COVID-19 Virus (Coronavirus) by PCR   2. Sore throat  J02.9 Streptococcus A Rapid Scr w Reflx to PCR - Lab Collect       Red flags and emergent follow up discussed, and understood by patient  Follow up with PCP if symptoms worsen or fail to improve    No follow-ups on file.    Phone Call Duration : 20  minutes    Mati Mera PA-C , MSPA, PA-C  [facility]  -----------------------------------------------------------------------------------------------------------------------------------------------------------------    Subjective:   Yeyo Bradley is a 61 year old female who is contacted via " telephone through scheduled urgent care virtual visit to discuss: chills, aches, sore throat, feverish    No treatments tried. Patient reports no fever/chills, headache, chest pain, shortness of breath, abdominal pain, nausea, vomiting, diarrhea, rash, or any other symptoms.     Past Medical History:   Diagnosis Date     Asthma      Diabetes (H)          Objective:   Gen:  NAD  Pulm: non-labored work of breathing    No results found for any visits on 12/17/21.    There are no Patient Instructions on file for this visit.

## 2021-12-17 NOTE — TELEPHONE ENCOUNTER
Patient's daughter calling to see if you would be able to see her today or by virtual video for a cough and other symptoms she states. TC advised you are booked, but she asked me to send the request.  Please advise.   Thank you,  Anjum Avila

## 2021-12-20 ENCOUNTER — TELEPHONE (OUTPATIENT)
Dept: FAMILY MEDICINE | Facility: CLINIC | Age: 61
End: 2021-12-20
Payer: COMMERCIAL

## 2021-12-20 DIAGNOSIS — J45.40 MODERATE PERSISTENT ASTHMA WITHOUT COMPLICATION: ICD-10-CM

## 2021-12-21 NOTE — TELEPHONE ENCOUNTER
"Provider:  This is not the same strength as in her chart.  Routing refill request to provider for review/approval because:  Failed protocol.  No future appointment scheduled. Please advise. Thank you. Rekha Groves R.N.    Requested Prescriptions   Pending Prescriptions Disp Refills    fluticasone-salmeterol (ADVAIR-HFA) 115-21 MCG/ACT inhaler 12 g 2     Sig: Inhale 2 puffs into the lungs 2 times daily        Inhaled Steroids Protocol Failed - 12/20/2021  7:43 AM        Failed - Asthma control assessment score within normal limits in last 6 months     Please review ACT score.           Passed - Patient is age 12 or older        Passed - Medication is active on med list        Passed - Recent (6 mo) or future (30 days) visit within the authorizing provider's specialty     Patient had office visit in the last 6 months or has a visit in the next 30 days with authorizing provider or within the authorizing provider's specialty.  See \"Patient Info\" tab in inbasket, or \"Choose Columns\" in Meds & Orders section of the refill encounter.           Long-Acting Beta Agonist Inhalers Protocol  Failed - 12/20/2021  7:43 AM        Failed - Asthma control assessment score within normal limits in last 6 months     Please review ACT score.           Passed - Patient is age 12 or older        Passed - Order for Serevent, Striverdi, or Foradil and pt has steroid inhaler        Passed - Medication is active on med list        Passed - Recent (6 mo) or future (30 days) visit within the authorizing provider's specialty     Patient had office visit in the last 6 months or has a visit in the next 30 days with authorizing provider or within the authorizing provider's specialty.  See \"Patient Info\" tab in inbasket, or \"Choose Columns\" in Meds & Orders section of the refill encounter.              ;    "

## 2021-12-23 RX ORDER — FLUTICASONE PROPIONATE AND SALMETEROL XINAFOATE 115; 21 UG/1; UG/1
2 AEROSOL, METERED RESPIRATORY (INHALATION) 2 TIMES DAILY
Qty: 12 G | Refills: 2 | OUTPATIENT
Start: 2021-12-23

## 2021-12-23 NOTE — TELEPHONE ENCOUNTER
Pt was given 6 month supply 9/2021 of higher dose.  Please call and clarify what dose she is taking.     ANTONI Marin CNP

## 2021-12-23 NOTE — TELEPHONE ENCOUNTER
Left a message to return a call to [phone].  Rekha Groves R.N.    Call placed with an [place]  [id number].     had to leave a message to return a call to [phone]. Thank you. Rekha Groves R.N.

## 2021-12-24 ENCOUNTER — APPOINTMENT (OUTPATIENT)
Dept: INTERPRETER SERVICES | Facility: CLINIC | Age: 61
End: 2021-12-24
Payer: COMMERCIAL

## 2021-12-24 NOTE — TELEPHONE ENCOUNTER
No answer when attempted to contact patient.    Call to pharmacy, spoke to pharmacist, patient has been receiving refills for Advair 250-50 dose.   Pharmacy will disregard this request.    Maya Figueroa RN

## 2022-01-09 ENCOUNTER — HEALTH MAINTENANCE LETTER (OUTPATIENT)
Age: 62
End: 2022-01-09

## 2022-05-01 ENCOUNTER — HEALTH MAINTENANCE LETTER (OUTPATIENT)
Age: 62
End: 2022-05-01

## 2022-07-11 ENCOUNTER — TELEPHONE (OUTPATIENT)
Dept: FAMILY MEDICINE | Facility: CLINIC | Age: 62
End: 2022-07-11

## 2022-07-11 NOTE — TELEPHONE ENCOUNTER
Patient Quality Outreach    Patient is due for the following:   Diabetes -    Asthma  -    Colon Cancer Screening -    Breast Cancer Screening - Mammogram  Physical   Immunizations  -      NEXT STEPS:   Schedule a office visit for Diabetes, asthma, colon cancer screening, mammogram,immunizations and physical    Type of outreach:    Sent [employer] message.      Questions for provider review:    None     Rosa Ellington

## 2022-08-21 ENCOUNTER — HEALTH MAINTENANCE LETTER (OUTPATIENT)
Age: 62
End: 2022-08-21

## 2022-11-21 ENCOUNTER — HEALTH MAINTENANCE LETTER (OUTPATIENT)
Age: 62
End: 2022-11-21

## 2022-12-25 ENCOUNTER — HEALTH MAINTENANCE LETTER (OUTPATIENT)
Age: 62
End: 2022-12-25

## 2023-04-16 ENCOUNTER — HEALTH MAINTENANCE LETTER (OUTPATIENT)
Age: 63
End: 2023-04-16

## 2023-09-16 ENCOUNTER — HEALTH MAINTENANCE LETTER (OUTPATIENT)
Age: 63
End: 2023-09-16

## 2023-11-25 ENCOUNTER — HEALTH MAINTENANCE LETTER (OUTPATIENT)
Age: 63
End: 2023-11-25

## 2024-02-03 ENCOUNTER — HEALTH MAINTENANCE LETTER (OUTPATIENT)
Age: 64
End: 2024-02-03

## 2024-06-22 ENCOUNTER — HEALTH MAINTENANCE LETTER (OUTPATIENT)
Age: 64
End: 2024-06-22

## 2024-08-31 ENCOUNTER — HEALTH MAINTENANCE LETTER (OUTPATIENT)
Age: 64
End: 2024-08-31

## 2024-11-09 ENCOUNTER — HEALTH MAINTENANCE LETTER (OUTPATIENT)
Age: 64
End: 2024-11-09